# Patient Record
Sex: MALE | Race: WHITE | NOT HISPANIC OR LATINO | Employment: OTHER | URBAN - METROPOLITAN AREA
[De-identification: names, ages, dates, MRNs, and addresses within clinical notes are randomized per-mention and may not be internally consistent; named-entity substitution may affect disease eponyms.]

---

## 2017-03-13 ENCOUNTER — COMMUNICATION - HEALTHEAST (OUTPATIENT)
Dept: INTERNAL MEDICINE | Facility: CLINIC | Age: 52
End: 2017-03-13

## 2017-03-13 DIAGNOSIS — E11.9 TYPE 2 DIABETES MELLITUS WITHOUT COMPLICATION (H): ICD-10-CM

## 2017-06-26 ENCOUNTER — COMMUNICATION - HEALTHEAST (OUTPATIENT)
Dept: INTERNAL MEDICINE | Facility: CLINIC | Age: 52
End: 2017-06-26

## 2017-08-10 ENCOUNTER — COMMUNICATION - HEALTHEAST (OUTPATIENT)
Dept: INTERNAL MEDICINE | Facility: CLINIC | Age: 52
End: 2017-08-10

## 2017-08-10 DIAGNOSIS — E11.9 DIABETES (H): ICD-10-CM

## 2017-08-29 ENCOUNTER — COMMUNICATION - HEALTHEAST (OUTPATIENT)
Dept: INTERNAL MEDICINE | Facility: CLINIC | Age: 52
End: 2017-08-29

## 2017-08-29 DIAGNOSIS — E11.9 TYPE 2 DIABETES MELLITUS WITHOUT COMPLICATION (H): ICD-10-CM

## 2017-09-18 ENCOUNTER — COMMUNICATION - HEALTHEAST (OUTPATIENT)
Dept: INTERNAL MEDICINE | Facility: CLINIC | Age: 52
End: 2017-09-18

## 2017-09-18 DIAGNOSIS — E11.9 DIABETES (H): ICD-10-CM

## 2017-09-28 ENCOUNTER — OFFICE VISIT - HEALTHEAST (OUTPATIENT)
Dept: INTERNAL MEDICINE | Facility: CLINIC | Age: 52
End: 2017-09-28

## 2017-09-28 ENCOUNTER — COMMUNICATION - HEALTHEAST (OUTPATIENT)
Dept: TELEHEALTH | Facility: CLINIC | Age: 52
End: 2017-09-28

## 2017-09-28 DIAGNOSIS — E11.9 DIABETES MELLITUS, TYPE II (H): ICD-10-CM

## 2017-09-28 DIAGNOSIS — Z12.11 SCREEN FOR COLON CANCER: ICD-10-CM

## 2017-09-28 DIAGNOSIS — R53.83 FATIGUE: ICD-10-CM

## 2017-09-28 LAB
CHOLEST SERPL-MCNC: 182 MG/DL
FASTING STATUS PATIENT QL REPORTED: NO
HBA1C MFR BLD: 6.9 % (ref 3.5–6)
HDLC SERPL-MCNC: 28 MG/DL
LDLC SERPL CALC-MCNC: 111 MG/DL
TRIGL SERPL-MCNC: 213 MG/DL

## 2017-10-02 ENCOUNTER — COMMUNICATION - HEALTHEAST (OUTPATIENT)
Dept: INTERNAL MEDICINE | Facility: CLINIC | Age: 52
End: 2017-10-02

## 2017-10-02 DIAGNOSIS — R79.89 LOW TESTOSTERONE IN MALE: ICD-10-CM

## 2017-10-03 ENCOUNTER — COMMUNICATION - HEALTHEAST (OUTPATIENT)
Dept: INTERNAL MEDICINE | Facility: CLINIC | Age: 52
End: 2017-10-03

## 2017-12-19 ENCOUNTER — COMMUNICATION - HEALTHEAST (OUTPATIENT)
Dept: INTERNAL MEDICINE | Facility: CLINIC | Age: 52
End: 2017-12-19

## 2017-12-19 DIAGNOSIS — E11.9 DIABETES (H): ICD-10-CM

## 2017-12-19 DIAGNOSIS — E11.9 TYPE 2 DIABETES MELLITUS WITHOUT COMPLICATION (H): ICD-10-CM

## 2018-04-24 ENCOUNTER — OFFICE VISIT - HEALTHEAST (OUTPATIENT)
Dept: ENDOCRINOLOGY | Facility: CLINIC | Age: 53
End: 2018-04-24

## 2018-04-24 DIAGNOSIS — E11.9 DIABETES MELLITUS, TYPE II (H): ICD-10-CM

## 2018-04-24 LAB
CREAT SERPL-MCNC: 0.91 MG/DL (ref 0.7–1.3)
FERRITIN SERPL-MCNC: 244 NG/ML (ref 27–300)
FSH SERPL-ACNC: 5.6 MIU/ML (ref 0–12)
GFR SERPL CREATININE-BSD FRML MDRD: >60 ML/MIN/1.73M2
HBA1C MFR BLD: 7.3 % (ref 3.5–6)
LH SERPL-ACNC: 2.9 MIU/ML
POTASSIUM BLD-SCNC: 4.5 MMOL/L (ref 3.5–5)
PSA SERPL-MCNC: 0.9 NG/ML (ref 0–3.5)
T4 FREE SERPL-MCNC: 1 NG/DL (ref 0.7–1.8)
TSH SERPL DL<=0.005 MIU/L-ACNC: 1.18 UIU/ML (ref 0.3–5)

## 2018-04-24 ASSESSMENT — MIFFLIN-ST. JEOR: SCORE: 1956.21

## 2018-04-25 LAB — TESTOST SERPL-MCNC: 232 NG/DL (ref 221–716)

## 2018-05-02 ENCOUNTER — COMMUNICATION - HEALTHEAST (OUTPATIENT)
Dept: ENDOCRINOLOGY | Facility: CLINIC | Age: 53
End: 2018-05-02

## 2018-05-02 DIAGNOSIS — E11.9 DIABETES (H): ICD-10-CM

## 2018-09-26 ENCOUNTER — COMMUNICATION - HEALTHEAST (OUTPATIENT)
Dept: INTERNAL MEDICINE | Facility: CLINIC | Age: 53
End: 2018-09-26

## 2018-09-26 DIAGNOSIS — E11.9 TYPE 2 DIABETES MELLITUS WITHOUT COMPLICATION (H): ICD-10-CM

## 2018-09-28 ENCOUNTER — COMMUNICATION - HEALTHEAST (OUTPATIENT)
Dept: INTERNAL MEDICINE | Facility: CLINIC | Age: 53
End: 2018-09-28

## 2018-09-28 ENCOUNTER — OFFICE VISIT - HEALTHEAST (OUTPATIENT)
Dept: INTERNAL MEDICINE | Facility: CLINIC | Age: 53
End: 2018-09-28

## 2018-09-28 DIAGNOSIS — E11.9 DIABETES (H): ICD-10-CM

## 2018-09-28 DIAGNOSIS — E11.9 DIABETES MELLITUS, TYPE II (H): ICD-10-CM

## 2018-09-28 DIAGNOSIS — E11.9 TYPE 2 DIABETES MELLITUS WITHOUT COMPLICATION (H): ICD-10-CM

## 2018-09-28 LAB
ALBUMIN SERPL-MCNC: 3.8 G/DL (ref 3.5–5)
ALP SERPL-CCNC: 105 U/L (ref 45–120)
ALT SERPL W P-5'-P-CCNC: 63 U/L (ref 0–45)
ANION GAP SERPL CALCULATED.3IONS-SCNC: 8 MMOL/L (ref 5–18)
AST SERPL W P-5'-P-CCNC: 41 U/L (ref 0–40)
BILIRUB SERPL-MCNC: 0.7 MG/DL (ref 0–1)
BUN SERPL-MCNC: 14 MG/DL (ref 8–22)
CALCIUM SERPL-MCNC: 9.5 MG/DL (ref 8.5–10.5)
CHLORIDE BLD-SCNC: 108 MMOL/L (ref 98–107)
CHOLEST SERPL-MCNC: 179 MG/DL
CO2 SERPL-SCNC: 25 MMOL/L (ref 22–31)
CREAT SERPL-MCNC: 0.87 MG/DL (ref 0.7–1.3)
CREAT UR-MCNC: 138.3 MG/DL
FASTING STATUS PATIENT QL REPORTED: YES
GFR SERPL CREATININE-BSD FRML MDRD: >60 ML/MIN/1.73M2
GLUCOSE BLD-MCNC: 183 MG/DL (ref 70–125)
HBA1C MFR BLD: 7 % (ref 3.5–6)
HDLC SERPL-MCNC: 30 MG/DL
LDLC SERPL CALC-MCNC: 127 MG/DL
MICROALBUMIN UR-MCNC: 0.69 MG/DL (ref 0–1.99)
MICROALBUMIN/CREAT UR: 5 MG/G
POTASSIUM BLD-SCNC: 4.6 MMOL/L (ref 3.5–5)
PROT SERPL-MCNC: 6.6 G/DL (ref 6–8)
SODIUM SERPL-SCNC: 141 MMOL/L (ref 136–145)
TRIGL SERPL-MCNC: 110 MG/DL

## 2018-10-04 ENCOUNTER — COMMUNICATION - HEALTHEAST (OUTPATIENT)
Dept: LAB | Facility: CLINIC | Age: 53
End: 2018-10-04

## 2018-10-04 DIAGNOSIS — E11.9 DIABETES MELLITUS, TYPE II (H): ICD-10-CM

## 2018-10-18 ENCOUNTER — AMBULATORY - HEALTHEAST (OUTPATIENT)
Dept: LAB | Facility: CLINIC | Age: 53
End: 2018-10-18

## 2018-10-18 DIAGNOSIS — E11.9 DIABETES MELLITUS, TYPE II (H): ICD-10-CM

## 2018-10-18 LAB
CREAT SERPL-MCNC: 0.89 MG/DL (ref 0.7–1.3)
GFR SERPL CREATININE-BSD FRML MDRD: >60 ML/MIN/1.73M2
POTASSIUM BLD-SCNC: 4.6 MMOL/L (ref 3.5–5)

## 2018-10-19 LAB
PSA FREE MFR SERPL: 14 %
PSA FREE SERPL-MCNC: 0.2 NG/ML
PSA SERPL IA-MCNC: 1.4 NG/ML (ref 0–4)
TESTOST SERPL-MCNC: 238 NG/DL (ref 221–716)

## 2018-10-25 ENCOUNTER — OFFICE VISIT - HEALTHEAST (OUTPATIENT)
Dept: ENDOCRINOLOGY | Facility: CLINIC | Age: 53
End: 2018-10-25

## 2018-10-25 DIAGNOSIS — E11.9 DIABETES MELLITUS, TYPE II (H): ICD-10-CM

## 2018-10-25 ASSESSMENT — MIFFLIN-ST. JEOR: SCORE: 1953.49

## 2018-11-23 ENCOUNTER — OFFICE VISIT - HEALTHEAST (OUTPATIENT)
Dept: FAMILY MEDICINE | Facility: CLINIC | Age: 53
End: 2018-11-23

## 2018-11-23 ENCOUNTER — COMMUNICATION - HEALTHEAST (OUTPATIENT)
Dept: INTERNAL MEDICINE | Facility: CLINIC | Age: 53
End: 2018-11-23

## 2018-11-23 DIAGNOSIS — T50.905A URTICARIA DUE TO DRUG ALLERGY: ICD-10-CM

## 2018-11-23 DIAGNOSIS — L50.0 URTICARIA DUE TO DRUG ALLERGY: ICD-10-CM

## 2018-11-23 DIAGNOSIS — E11.9 TYPE 2 DIABETES MELLITUS WITHOUT COMPLICATION, WITHOUT LONG-TERM CURRENT USE OF INSULIN (H): ICD-10-CM

## 2018-11-26 ENCOUNTER — OFFICE VISIT - HEALTHEAST (OUTPATIENT)
Dept: INTERNAL MEDICINE | Facility: CLINIC | Age: 53
End: 2018-11-26

## 2018-11-26 DIAGNOSIS — E11.9 TYPE 2 DIABETES MELLITUS WITHOUT COMPLICATION, WITHOUT LONG-TERM CURRENT USE OF INSULIN (H): ICD-10-CM

## 2018-11-26 DIAGNOSIS — Z12.11 SCREEN FOR COLON CANCER: ICD-10-CM

## 2018-11-28 ENCOUNTER — OFFICE VISIT - HEALTHEAST (OUTPATIENT)
Dept: PHARMACY | Facility: CLINIC | Age: 53
End: 2018-11-28

## 2018-11-28 DIAGNOSIS — E78.5 HYPERLIPIDEMIA, UNSPECIFIED HYPERLIPIDEMIA TYPE: ICD-10-CM

## 2018-11-28 DIAGNOSIS — E11.9 TYPE 2 DIABETES MELLITUS WITHOUT COMPLICATION, WITHOUT LONG-TERM CURRENT USE OF INSULIN (H): ICD-10-CM

## 2018-12-07 ENCOUNTER — OFFICE VISIT - HEALTHEAST (OUTPATIENT)
Dept: FAMILY MEDICINE | Facility: CLINIC | Age: 53
End: 2018-12-07

## 2018-12-07 DIAGNOSIS — L30.9 ECZEMA, UNSPECIFIED TYPE: ICD-10-CM

## 2018-12-12 ENCOUNTER — COMMUNICATION - HEALTHEAST (OUTPATIENT)
Dept: INTERNAL MEDICINE | Facility: CLINIC | Age: 53
End: 2018-12-12

## 2018-12-19 ENCOUNTER — COMMUNICATION - HEALTHEAST (OUTPATIENT)
Dept: INTERNAL MEDICINE | Facility: CLINIC | Age: 53
End: 2018-12-19

## 2018-12-19 ENCOUNTER — COMMUNICATION - HEALTHEAST (OUTPATIENT)
Dept: FAMILY MEDICINE | Facility: CLINIC | Age: 53
End: 2018-12-19

## 2018-12-19 DIAGNOSIS — L50.0 URTICARIA DUE TO DRUG ALLERGY: ICD-10-CM

## 2018-12-19 DIAGNOSIS — T50.905A URTICARIA DUE TO DRUG ALLERGY: ICD-10-CM

## 2018-12-20 ENCOUNTER — OFFICE VISIT - HEALTHEAST (OUTPATIENT)
Dept: FAMILY MEDICINE | Facility: CLINIC | Age: 53
End: 2018-12-20

## 2018-12-20 DIAGNOSIS — L50.9 URTICARIA: ICD-10-CM

## 2018-12-28 ENCOUNTER — OFFICE VISIT (OUTPATIENT)
Dept: DERMATOLOGY | Facility: CLINIC | Age: 53
End: 2018-12-28
Payer: COMMERCIAL

## 2018-12-28 DIAGNOSIS — L71.8 OCULAR ROSACEA: Primary | ICD-10-CM

## 2018-12-28 DIAGNOSIS — R21 RASH: ICD-10-CM

## 2018-12-28 RX ORDER — DOXYCYCLINE 50 MG/1
100 CAPSULE ORAL 2 TIMES DAILY
Qty: 28 CAPSULE | Refills: 0 | Status: SHIPPED | OUTPATIENT
Start: 2018-12-28 | End: 2019-01-04

## 2018-12-28 RX ORDER — TRIAMCINOLONE ACETONIDE 0.25 MG/G
OINTMENT TOPICAL 2 TIMES DAILY
Qty: 60 G | Refills: 1 | Status: SHIPPED | OUTPATIENT
Start: 2018-12-28 | End: 2019-12-28

## 2018-12-28 RX ORDER — HYDROCORTISONE 2.5 %
CREAM (GRAM) TOPICAL 2 TIMES DAILY
COMMUNITY

## 2018-12-28 RX ORDER — MULTIPLE VITAMINS W/ MINERALS TAB 9MG-400MCG
1 TAB ORAL DAILY
COMMUNITY

## 2018-12-28 RX ORDER — GLIMEPIRIDE 2 MG/1
2 TABLET ORAL
COMMUNITY

## 2018-12-28 RX ORDER — ATORVASTATIN CALCIUM 10 MG/1
10 TABLET, FILM COATED ORAL DAILY
COMMUNITY

## 2018-12-28 RX ORDER — FAMOTIDINE 20 MG/1
20 TABLET, FILM COATED ORAL 2 TIMES DAILY
COMMUNITY

## 2018-12-28 RX ORDER — MOMETASONE FUROATE 1 MG/G
OINTMENT TOPICAL DAILY
Qty: 240 G | Refills: 1 | Status: SHIPPED | OUTPATIENT
Start: 2018-12-28 | End: 2019-12-28

## 2018-12-28 ASSESSMENT — PAIN SCALES - GENERAL
PAINLEVEL: NO PAIN (0)
PAINLEVEL: NO PAIN (0)

## 2018-12-28 NOTE — PROGRESS NOTES
"[facility] Dermatology Note      Dermatology Problem List:  1. Rash - Ddx includes allergic contact dermatitis, atopic dermatitis flair, medication allergy, etc.   - Biopsy   - Mometasome and triamcinolone ointments  2. Occular Rosacea   - Doxycycline 100mg BID for 7 days   - Previous: PO doxycycline and topical tetracyclin  3. Xerosis   - Topical emollients        Encounter Date: Dec 28, 2018    CC:   Chief Complaint   Patient presents with     Derm Problem     Rash, Elvin states \" It started as a reaction to an injection about a month ago.\"          History of Present Illness:  Mr. Elvin Eden is a 53 year old male who presents for evaluation of the subacute development of a pruritic rash covering his face, upper back, upper chest and some arms. Patient developed this rash approximately 1 month ago; developed suddenly. He attributes the rash to initiating a new medication: liraglutide; he has subsequently stopped taking liraglutide. The patient went to [facility] and was given 7 days of oral prednisone. This helped somewhat, but rash worsened again after discontinuation. Since then, he has been applying some topical hydrocortisone that he had left over. He is also taking 1 Zyrtec a day as well a 2-4 Benadryl at night to help him sleep. The rash has not gotten any better. He otherwise feels fine.     Past Medical History:   There is no problem list on file for this patient.    No past medical history on file.  No past surgical history on file.    Social History:   reports that  has never smoked. he has never used smokeless tobacco.    Family History:  Family History   Problem Relation Age of Onset     Cancer No family hx of         no skin cancer       Medications:  Current Outpatient Medications   Medication Sig Dispense Refill     ammonium lactate (LAC-HYDRIN) 12 % cream Apply topically 2 times daily as needed for dry skin 385 g 3     atorvastatin (LIPITOR) 10 MG tablet " Take 10 mg by mouth daily       cetirizine (ZYRTEC) 10 MG tablet Take 2 tablets (20 mg) by mouth every evening 180 tablet 3     desonide (DESOWEN) 0.05 % ointment Apply topically 2 times daily To rash on the face and lips until healed 60 g 5     famotidine (PEPCID) 20 MG tablet Take 20 mg by mouth 2 times daily       glimepiride (AMARYL) 2 MG tablet Take 2 mg by mouth every morning (before breakfast)       hydrocortisone 2.5 % cream Apply topically 2 times daily       metFORMIN (GLUCOPHAGE) 1000 MG tablet Take 1,000 mg by mouth 2 times daily (with meals)       multivitamin w/minerals (MULTI-VITAMIN) tablet Take 1 tablet by mouth daily       triamcinolone (KENALOG) 0.1 % ointment Apply topically 2 times daily To red rash on the chest, back, and body as needed until improved. 80 g 5     clobetasol (TEMOVATE) 0.05 % external solution Apply topically 2 times daily To the scalp for itch and rash until clear. (Patient not taking: Reported on 12/28/2018) 60 mL 11     hydrOXYzine (ATARAX) 25 MG tablet Take 2 tablets (50 mg) by mouth At Bedtime (Patient not taking: Reported on 12/28/2018) 60 tablet 11     ketoconazole (NIZORAL) 2 % shampoo To entire wet scalp and then wash off after 5 minutes three times a week. (Patient not taking: Reported on 12/28/2018) 240 mL 11     minocycline (MINOCIN,DYNACIN) 100 MG capsule Take 1 capsule (100 mg) by mouth 2 times daily (Patient not taking: Reported on 12/28/2018) 60 capsule 3        Allergies   Allergen Reactions     Iodine Other (See Comments)     Worsening eczema     Liraglutide Hives     Seasonal Allergies      Victoza      Hives per pateint         Review of Systems:  -As per HPI  -Constitutional: The patient denies fatigue, fevers, chills, unintended weight loss, and night sweats.  -HEENT: Patient denies nonhealing oral sores.  -Skin: As above in HPI. No additional skin concerns.    Physical exam:  Vitals: There were no vitals taken for this visit.  GEN: This is a well  developed, well-nourished male in no acute distress, in a pleasant mood.    SKIN: Total skin excluding the undergarment areas was performed. The exam included the head/face, neck, both arms, chest, back, abdomen, both legs, digits and/or nails.   -There is xerosis of the entire body; worse on lower extremities.   -Erythematous, pink/red plaques worst of face, temples, eyelids, posterior ears, neck. Milder amounts on the back, chest and abdomen; lower extremities are spared.   -There are erythematous macules and scattered telangectasias on the bilateral eyelids  -No other lesions of concern on areas examined.     Impression/Plan:  1. Rash - Ddx includes allergic contact dermatitis, atopic dermatitis flair, medication allergy, etc.     Punch biopsy:  After discussion of benefits and risks including but not limited to bleeding/bruising, pain/swelling, infection, scar, incomplete removal, nerve damage/numbness, recurrence, and non-diagnostic biopsy, written consent, verbal consent and photographs were obtained. Time-out was performed. The area was cleaned with isopropyl alcohol. 0.5mL of 1% lidocaine with 1:100,000 epinephrine was injected to obtain adequate anesthesia of the lesion on the left posterior shoulder.  A 4 mm punch biopsy was performed. 4-0 ethilon sutures were utilized to approximate the epidermal edges. White petroleum jelly/Vaseline and a bandage was applied to the wound. Explicit verbal and written wound care instructions were provided. The patient left the Dermatology Clinic in good condition. The patient was counseled to follow up for suture removal in approximately 7 days.    Apply mometasome ointment 0.1% to rash on body and arm twice a day    Apply triamcinolone 0.025% ointment to rash on face twice a day    Start using vanicream products (shampoo, conditioner, and soap) as opposed to dove products    2. Occular Rosacea    Doxycylcine 100mg twice a day ongoing.         3. Xerosis    Use aquaphore  or vaseline ointments, especially after shower/bathing      Follow-up in 4-6 weeks, earlier for new or changing lesions.     Josemanuel Roque MD  Internal Medicine Resident, PGY3  [phone]    Dr. Velazquez staffed the patient.    Staff Involved:  Resident(Josemanuel Roque)/Staff(as above)      I have personally examined this patient and agree with Dr. Roque' documentation and plan of care. I have reviewed and amended the resident's note above. The documentation accurately reflects my clinical observations, diagnoses, treatment and follow-up plans. I was present for key portions of the procedure.       Sandie Velazquez MD  Dermatology Staff

## 2018-12-28 NOTE — PATIENT INSTRUCTIONS
- Apply mometasome ointment to rash on body and arm twice a day    - Apply triamcinolone ointment to rash on face twice a day    - Take doxycylcine 100mg twice a day for 7 day for occular rosacea (the rash on your eyes)    - Start using vanicream products (shampoo, conditioner, and soap) as opposed to dove products    - Use aquaphore or vaseline ointment for you dry skin (especially your legs)    - We took a skin biopsy today; you will be called with the results in ~2 weeks    Wound Care After a Biopsy    What is a skin biopsy?  A skin biopsy allows the doctor to examine a very small piece of tissue under the microscope to determine the diagnosis and the best treatment for the skin condition. A local anesthetic (numbing medicine)  is injected with a very small needle into the skin area to be tested. A small piece of skin is taken from the area. Sometimes a suture (stitch) is used.     What are the risks of a skin biopsy?  I will experience scar, bleeding, swelling, pain, crusting and redness. I may experience incomplete removal or recurrence. Risks of this procedure are excessive bleeding, bruising, infection, nerve damage, numbness, thick (hypertrophic or keloidal) scar and non-diagnostic biopsy.    How should I care for my wound for the first 24 hours?    Keep the wound dry and covered for 24 hours    If it bleeds, hold direct pressure on the area for 15 minutes. If bleeding does not stop then go to the emergency room    Avoid strenuous exercise the first 1-2 days or as your doctor instructs you    How should I care for the wound after 24 hours?    After 24 hours, remove the bandage    You may bathe or shower as normal    If you had a scalp biopsy, you can shampoo as usual and can use shower water to clean the biopsy site daily    Clean the wound twice a day with gentle soap and water    Do not scrub, be gentle    Apply white petroleum/Vaseline after cleaning the wound with a cotton swab or a clean finger, and  keep the site covered with a Bandaid /bandage. Bandages are not necessary with a scalp biopsy    If you are unable to cover the site with a Bandaid /bandage, re-apply ointment 2-3 times a day to keep the site moist. Moisture will help with healing    Avoid strenuous activity for first 1-2 days    Avoid lakes, rivers, pools, and oceans until the stitches are removed or the site is healed    How do I clean my wound?    Wash hands thoroughly with soap or use hand  before all wound care    Clean the wound with gentle soap and water    Apply white petroleum/Vaseline  to wound after it is clean    Replace the Bandaid /bandage to keep the wound covered for the first few days or as instructed by your doctor    If you had a scalp biopsy, warm shower water to the area on a daily basis should suffice    What should I use to clean my wound?     Cotton-tipped applicators (Qtips )    White petroleum jelly (Vaseline ). Use a clean new container and use Q-tips to apply.    Bandaids   as needed    Gentle soap     How should I care for my wound long term?    Do not get your wound dirty    Keep up with wound care for one week or until the area is healed.    A small scab will form and fall off by itself when the area is completely healed. The area will be red and will become pink in color as it heals. Sun protection is very important for how your scar will turn out. Sunscreen with an SPF 30 or greater is recommended once the area is healed.    If you have stitches, stitches need to be removed in 10-14 days. You may return to our clinic for this or you may have it done locally at your doctor s office.    You should have some soreness but it should be mild and slowly go away over several days. Talk to your doctor about using tylenol for pain,    When should I call my doctor?  If you have increased:     Pain or swelling    Pus or drainage (clear or slightly yellow drainage is ok)    Temperature over 100F    Spreading redness or  warmth around wound    When will I hear about my results?  The biopsy results can take 2-3 weeks to come back. The clinic will call you with the results, send you a [employer] message, or have you schedule a follow-up clinic or phone time to discuss the results. Contact our clinics if you do not hear from us in 3 weeks.     Who should I call with questions?    [facility]: [phone]     [facility]: [phone]    For urgent needs outside of business hours call the [facility] [[email or web address]] at [phone] and ask for the dermatology resident on call

## 2018-12-28 NOTE — LETTER
"12/28/2018       RE: Elvin Eden  [address] [address]     Dear Colleague,    Thank you for referring your patient, Elvin Eden, to the [facility] DERMATOLOGY at [facility]. Please see a copy of my visit note below.    [facility] Dermatology Note      Dermatology Problem List:  1. Rash - Ddx includes allergic contact dermatitis, atopic dermatitis flair, medication allergy, etc.   - Biopsy   - Mometasome and triamcinolone ointments  2. Occular Rosacea   - Doxycycline 100mg BID for 7 days   - Previous: PO doxycycline and topical tetracyclin  3. Xerosis   - Topical emollients        Encounter Date: Dec 28, 2018    CC:   Chief Complaint   Patient presents with     Derm Problem     Rash, Elvin states \" It started as a reaction to an injection about a month ago.\"          History of Present Illness:  Mr. Elvin Eden is a 53 year old male who presents for evaluation of the subacute development of a pruritic rash covering his face, upper back, upper chest and some arms. Patient developed this rash approximately 1 month ago; developed suddenly. He attributes the rash to initiating a new medication: liraglutide; he has subsequently stopped taking liraglutide. The patient went to [facility] and was given 7 days of oral prednisone. This helped somewhat, but rash worsened again after discontinuation. Since then, he has been applying some topical hydrocortisone that he had left over. He is also taking 1 Zyrtec a day as well a 2-4 Benadryl at night to help him sleep. The rash has not gotten any better. He otherwise feels fine.     Past Medical History:   There is no problem list on file for this patient.    No past medical history on file.  No past surgical history on file.    Social History:   reports that  has never smoked. he has never used smokeless tobacco.    Family History:  Family History   Problem " Relation Age of Onset     Cancer No family hx of         no skin cancer       Medications:  Current Outpatient Medications   Medication Sig Dispense Refill     ammonium lactate (LAC-HYDRIN) 12 % cream Apply topically 2 times daily as needed for dry skin 385 g 3     atorvastatin (LIPITOR) 10 MG tablet Take 10 mg by mouth daily       cetirizine (ZYRTEC) 10 MG tablet Take 2 tablets (20 mg) by mouth every evening 180 tablet 3     desonide (DESOWEN) 0.05 % ointment Apply topically 2 times daily To rash on the face and lips until healed 60 g 5     famotidine (PEPCID) 20 MG tablet Take 20 mg by mouth 2 times daily       glimepiride (AMARYL) 2 MG tablet Take 2 mg by mouth every morning (before breakfast)       hydrocortisone 2.5 % cream Apply topically 2 times daily       metFORMIN (GLUCOPHAGE) 1000 MG tablet Take 1,000 mg by mouth 2 times daily (with meals)       multivitamin w/minerals (MULTI-VITAMIN) tablet Take 1 tablet by mouth daily       triamcinolone (KENALOG) 0.1 % ointment Apply topically 2 times daily To red rash on the chest, back, and body as needed until improved. 80 g 5     clobetasol (TEMOVATE) 0.05 % external solution Apply topically 2 times daily To the scalp for itch and rash until clear. (Patient not taking: Reported on 12/28/2018) 60 mL 11     hydrOXYzine (ATARAX) 25 MG tablet Take 2 tablets (50 mg) by mouth At Bedtime (Patient not taking: Reported on 12/28/2018) 60 tablet 11     ketoconazole (NIZORAL) 2 % shampoo To entire wet scalp and then wash off after 5 minutes three times a week. (Patient not taking: Reported on 12/28/2018) 240 mL 11     minocycline (MINOCIN,DYNACIN) 100 MG capsule Take 1 capsule (100 mg) by mouth 2 times daily (Patient not taking: Reported on 12/28/2018) 60 capsule 3        Allergies   Allergen Reactions     Iodine Other (See Comments)     Worsening eczema     Liraglutide Hives     Seasonal Allergies      Victoza      Hives per pateint         Review of Systems:  -As per  HPI  -Constitutional: The patient denies fatigue, fevers, chills, unintended weight loss, and night sweats.  -HEENT: Patient denies nonhealing oral sores.  -Skin: As above in HPI. No additional skin concerns.    Physical exam:  Vitals: There were no vitals taken for this visit.  GEN: This is a well developed, well-nourished male in no acute distress, in a pleasant mood.    SKIN: Total skin excluding the undergarment areas was performed. The exam included the head/face, neck, both arms, chest, back, abdomen, both legs, digits and/or nails.   -There is xerosis of the entire body; worse on lower extremities.   -Erythematous, pink/red plaques worst of face, temples, eyelids, posterior ears, neck. Milder amounts on the back, chest and abdomen; lower extremities are spared.   -There are erythematous macules and scattered telangectasias on the bilateral eyelids  -No other lesions of concern on areas examined.     Impression/Plan:  1. Rash - Ddx includes allergic contact dermatitis, atopic dermatitis flair, medication allergy, etc.     Punch biopsy:  After discussion of benefits and risks including but not limited to bleeding/bruising, pain/swelling, infection, scar, incomplete removal, nerve damage/numbness, recurrence, and non-diagnostic biopsy, written consent, verbal consent and photographs were obtained. Time-out was performed. The area was cleaned with isopropyl alcohol. 0.5mL of 1% lidocaine with 1:100,000 epinephrine was injected to obtain adequate anesthesia of the lesion on the left posterior shoulder.  A 4 mm punch biopsy was performed. 4-0 ethilon sutures were utilized to approximate the epidermal edges. White petroleum jelly/Vaseline and a bandage was applied to the wound. Explicit verbal and written wound care instructions were provided. The patient left the Dermatology Clinic in good condition. The patient was counseled to follow up for suture removal in approximately 7 days.    Apply mometasome ointment 0.1%  to rash on body and arm twice a day    Apply triamcinolone 0.025% ointment to rash on face twice a day    Start using vanicream products (shampoo, conditioner, and soap) as opposed to dove products    2. Occular Rosacea    Doxycylcine 100mg twice a day ongoing.         3. Xerosis    Use aquaphore or vaseline ointments, especially after shower/bathing      Follow-up in 4-6 weeks, earlier for new or changing lesions.     Josemanuel Roque MD  Internal Medicine Resident, PGY3  [phone]    Dr. Velazquez staffed the patient.    Staff Involved:  Resident(Josemanuel Roque)/Staff(as above)    I have personally examined this patient and agree with Dr. Roque' documentation and plan of care. I have reviewed and amended the resident's note above. The documentation accurately reflects my clinical observations, diagnoses, treatment and follow-up plans. I was present for key portions of the procedure.                 Sandie Velazquez MD

## 2018-12-28 NOTE — NURSING NOTE
"Dermatology Rooming Note    Elvin Eden's goals for this visit include:   Chief Complaint   Patient presents with     Derm Problem     Rash, Elvin states \" It started as a reaction to an injection about a month ago.\"      Shahla Ren LPN    "

## 2018-12-29 RX ORDER — DOXYCYCLINE 100 MG/1
100 CAPSULE ORAL DAILY
Qty: 30 CAPSULE | Refills: 3 | Status: SHIPPED | OUTPATIENT
Start: 2018-12-29

## 2018-12-31 ENCOUNTER — COMMUNICATION - HEALTHEAST (OUTPATIENT)
Dept: PHARMACY | Facility: CLINIC | Age: 53
End: 2018-12-31

## 2018-12-31 ENCOUNTER — COMMUNICATION - HEALTHEAST (OUTPATIENT)
Dept: TELEHEALTH | Facility: CLINIC | Age: 53
End: 2018-12-31

## 2018-12-31 ENCOUNTER — OFFICE VISIT - HEALTHEAST (OUTPATIENT)
Dept: PHARMACY | Facility: CLINIC | Age: 53
End: 2018-12-31

## 2018-12-31 DIAGNOSIS — E78.5 HYPERLIPIDEMIA, UNSPECIFIED HYPERLIPIDEMIA TYPE: ICD-10-CM

## 2018-12-31 DIAGNOSIS — E11.9 TYPE 2 DIABETES MELLITUS WITHOUT COMPLICATION, WITHOUT LONG-TERM CURRENT USE OF INSULIN (H): ICD-10-CM

## 2019-01-25 ENCOUNTER — COMMUNICATION - HEALTHEAST (OUTPATIENT)
Dept: INTERNAL MEDICINE | Facility: CLINIC | Age: 54
End: 2019-01-25

## 2019-01-25 ENCOUNTER — DOCUMENTATION ONLY (OUTPATIENT)
Dept: CARE COORDINATION | Facility: CLINIC | Age: 54
End: 2019-01-25

## 2019-01-25 DIAGNOSIS — E11.9 TYPE 2 DIABETES MELLITUS WITHOUT COMPLICATION (H): ICD-10-CM

## 2019-02-04 ENCOUNTER — COMMUNICATION - HEALTHEAST (OUTPATIENT)
Dept: PHARMACY | Facility: CLINIC | Age: 54
End: 2019-02-04

## 2019-02-04 DIAGNOSIS — E78.5 HYPERLIPIDEMIA, UNSPECIFIED HYPERLIPIDEMIA TYPE: ICD-10-CM

## 2019-02-04 DIAGNOSIS — E11.9 TYPE 2 DIABETES MELLITUS WITHOUT COMPLICATION (H): ICD-10-CM

## 2019-02-04 DIAGNOSIS — E11.9 TYPE 2 DIABETES MELLITUS WITHOUT COMPLICATION, WITHOUT LONG-TERM CURRENT USE OF INSULIN (H): ICD-10-CM

## 2019-02-27 ENCOUNTER — COMMUNICATION - HEALTHEAST (OUTPATIENT)
Dept: INTERNAL MEDICINE | Facility: CLINIC | Age: 54
End: 2019-02-27

## 2019-02-27 DIAGNOSIS — L50.9 URTICARIA: ICD-10-CM

## 2019-03-12 ENCOUNTER — AMBULATORY - HEALTHEAST (OUTPATIENT)
Dept: PHARMACY | Facility: CLINIC | Age: 54
End: 2019-03-12

## 2019-03-12 DIAGNOSIS — E11.9 TYPE 2 DIABETES MELLITUS WITHOUT COMPLICATION, WITHOUT LONG-TERM CURRENT USE OF INSULIN (H): ICD-10-CM

## 2019-03-12 DIAGNOSIS — E78.5 HYPERLIPIDEMIA, UNSPECIFIED HYPERLIPIDEMIA TYPE: ICD-10-CM

## 2019-04-18 ENCOUNTER — COMMUNICATION - HEALTHEAST (OUTPATIENT)
Dept: FAMILY MEDICINE | Facility: CLINIC | Age: 54
End: 2019-04-18

## 2019-04-18 ENCOUNTER — AMBULATORY - HEALTHEAST (OUTPATIENT)
Dept: LAB | Facility: CLINIC | Age: 54
End: 2019-04-18

## 2019-04-18 ENCOUNTER — HOSPITAL ENCOUNTER (OUTPATIENT)
Dept: LAB | Age: 54
Setting detail: SPECIMEN
Discharge: HOME | End: 2019-04-18

## 2019-04-18 DIAGNOSIS — E11.9 TYPE 2 DIABETES MELLITUS WITHOUT COMPLICATION, WITHOUT LONG-TERM CURRENT USE OF INSULIN (H): ICD-10-CM

## 2019-04-18 DIAGNOSIS — E11.9 DIABETES MELLITUS, TYPE II (H): ICD-10-CM

## 2019-04-18 LAB
CHOLEST SERPL-MCNC: 140 MG/DL
CREAT SERPL-MCNC: 0.85 MG/DL (ref 0.7–1.3)
FASTING STATUS PATIENT QL REPORTED: NO
GFR SERPL CREATININE-BSD FRML MDRD: >60 ML/MIN/1.73M2
HBA1C MFR BLD: 6.2 % (ref 3.5–6)
HDLC SERPL-MCNC: 21 MG/DL
LDLC SERPL CALC-MCNC: 90 MG/DL
POTASSIUM BLD-SCNC: 4.4 MMOL/L (ref 3.5–5)
TRIGL SERPL-MCNC: 146 MG/DL

## 2019-04-24 ENCOUNTER — COMMUNICATION - HEALTHEAST (OUTPATIENT)
Dept: ENDOCRINOLOGY | Facility: CLINIC | Age: 54
End: 2019-04-24

## 2019-07-09 ENCOUNTER — OFFICE VISIT - HEALTHEAST (OUTPATIENT)
Dept: FAMILY MEDICINE | Facility: CLINIC | Age: 54
End: 2019-07-09

## 2019-07-09 DIAGNOSIS — J20.8 ACUTE VIRAL BRONCHITIS: ICD-10-CM

## 2019-07-17 ENCOUNTER — OFFICE VISIT - HEALTHEAST (OUTPATIENT)
Dept: FAMILY MEDICINE | Facility: CLINIC | Age: 54
End: 2019-07-17

## 2019-07-17 DIAGNOSIS — R09.89 RESPIRATORY CRACKLES AT LEFT LUNG BASE: ICD-10-CM

## 2019-07-19 ENCOUNTER — OFFICE VISIT - HEALTHEAST (OUTPATIENT)
Dept: INTERNAL MEDICINE | Facility: CLINIC | Age: 54
End: 2019-07-19

## 2019-07-19 DIAGNOSIS — J18.9 COMMUNITY ACQUIRED PNEUMONIA OF RIGHT MIDDLE LOBE OF LUNG: ICD-10-CM

## 2019-07-19 DIAGNOSIS — E11.9 TYPE 2 DIABETES MELLITUS WITHOUT COMPLICATION, WITHOUT LONG-TERM CURRENT USE OF INSULIN (H): ICD-10-CM

## 2019-07-30 ENCOUNTER — COMMUNICATION - HEALTHEAST (OUTPATIENT)
Dept: FAMILY MEDICINE | Facility: CLINIC | Age: 54
End: 2019-07-30

## 2019-07-30 DIAGNOSIS — J20.8 ACUTE VIRAL BRONCHITIS: ICD-10-CM

## 2019-08-21 ENCOUNTER — COMMUNICATION - HEALTHEAST (OUTPATIENT)
Dept: INTERNAL MEDICINE | Facility: CLINIC | Age: 54
End: 2019-08-21

## 2019-08-22 ENCOUNTER — COMMUNICATION - HEALTHEAST (OUTPATIENT)
Dept: FAMILY MEDICINE | Facility: CLINIC | Age: 54
End: 2019-08-22

## 2019-08-22 DIAGNOSIS — J20.8 ACUTE VIRAL BRONCHITIS: ICD-10-CM

## 2019-08-27 ENCOUNTER — COMMUNICATION - HEALTHEAST (OUTPATIENT)
Dept: INTERNAL MEDICINE | Facility: CLINIC | Age: 54
End: 2019-08-27

## 2019-09-04 ENCOUNTER — COMMUNICATION - HEALTHEAST (OUTPATIENT)
Dept: INTERNAL MEDICINE | Facility: CLINIC | Age: 54
End: 2019-09-04

## 2019-12-04 ENCOUNTER — COMMUNICATION - HEALTHEAST (OUTPATIENT)
Dept: INTERNAL MEDICINE | Facility: CLINIC | Age: 54
End: 2019-12-04

## 2020-04-21 ENCOUNTER — COMMUNICATION - HEALTHEAST (OUTPATIENT)
Dept: INTERNAL MEDICINE | Facility: CLINIC | Age: 55
End: 2020-04-21

## 2020-04-21 DIAGNOSIS — E11.9 TYPE 2 DIABETES MELLITUS WITHOUT COMPLICATION, WITHOUT LONG-TERM CURRENT USE OF INSULIN (H): ICD-10-CM

## 2020-07-15 ENCOUNTER — COMMUNICATION - HEALTHEAST (OUTPATIENT)
Dept: INTERNAL MEDICINE | Facility: CLINIC | Age: 55
End: 2020-07-15

## 2020-07-15 DIAGNOSIS — E11.9 TYPE 2 DIABETES MELLITUS WITHOUT COMPLICATION, WITHOUT LONG-TERM CURRENT USE OF INSULIN (H): ICD-10-CM

## 2020-09-23 ENCOUNTER — COMMUNICATION - HEALTHEAST (OUTPATIENT)
Dept: INTERNAL MEDICINE | Facility: CLINIC | Age: 55
End: 2020-09-23

## 2020-09-23 DIAGNOSIS — E11.9 TYPE 2 DIABETES MELLITUS WITHOUT COMPLICATION (H): ICD-10-CM

## 2020-11-13 ENCOUNTER — COMMUNICATION - HEALTHEAST (OUTPATIENT)
Dept: INTERNAL MEDICINE | Facility: CLINIC | Age: 55
End: 2020-11-13

## 2020-11-13 DIAGNOSIS — E11.9 TYPE 2 DIABETES MELLITUS WITHOUT COMPLICATION, WITHOUT LONG-TERM CURRENT USE OF INSULIN (H): ICD-10-CM

## 2021-04-24 ENCOUNTER — COMMUNICATION - HEALTHEAST (OUTPATIENT)
Dept: INTERNAL MEDICINE | Facility: CLINIC | Age: 56
End: 2021-04-24

## 2021-04-24 DIAGNOSIS — E11.9 TYPE 2 DIABETES MELLITUS WITHOUT COMPLICATION, WITHOUT LONG-TERM CURRENT USE OF INSULIN (H): ICD-10-CM

## 2021-05-27 NOTE — TELEPHONE ENCOUNTER
RN cannot approve Refill Request    RN can NOT refill this medication overdue for office visits and/or labs.    Mateo Salgado, [facility] Triage/Med Refill 4/18/2019    Requested Prescriptions   Pending Prescriptions Disp Refills     glimepiride (AMARYL) 1 MG tablet [Pharmacy Med Name: GLIMEPIRIDE 1MG TABLETS] 60 tablet 0     Sig: TAKE 2 TABLETS BY MOUTH EVERY MORNING.       Oral Hypoglycemics Refill Protocol Failed - 4/18/2019  3:14 AM        Failed - A1C in last 6 months     Hemoglobin A1c   Date Value Ref Range Status   09/28/2018 7.0 (H) 3.5 - 6.0 % Final               Passed - Visit with PCP or prescribing provider visit in last 6 months       Last office visit with prescriber/PCP: 11/23/2018 OR same dept: 12/20/2018 Sarah Wheeler CNP OR same specialty: 12/20/2018 Sarah Wheeler CNP Last physical: Visit date not found Last MTM visit: Visit date not found         Next appt within 3 mo: Visit date not found  Next physical within 3 mo: Visit date not found  Prescriber OR PCP: Jose Enrique Coleman MD  Last diagnosis associated with med order: 1. Type 2 diabetes mellitus without complication, without long-term current use of insulin (H)  - glimepiride (AMARYL) 1 MG tablet [Pharmacy Med Name: GLIMEPIRIDE 1MG TABLETS]; TAKE 2 TABLETS BY MOUTH EVERY MORNING.  Dispense: 60 tablet; Refill: 0     If protocol passes may refill for 12 months if within 3 months of last provider visit (or a total of 15 months).           Passed - Microalbumin in last year      Microalbumin, Random Urine   Date Value Ref Range Status   09/28/2018 0.69 0.00 - 1.99 mg/dL Final                  Passed - Blood pressure in last year     BP Readings from Last 1 Encounters:   04/13/19 96/61             Passed - Serum creatinine in last year     Creatinine   Date Value Ref Range Status   04/12/2019 0.98 0.70 - 1.30 mg/dL Final

## 2021-05-28 NOTE — TELEPHONE ENCOUNTER
----- Message from Dany Newberry MD sent at 4/23/2019 11:59 AM CDT -----  Labs rev, they are normal. Diabetes is showing good control. Continue same medication. Check same labs in 6 months.

## 2021-05-30 NOTE — PROGRESS NOTES
ASSESSMENT and PLAN:    #1.  Clinical diagnosis of community-acquired pneumonia.  Given his negative x-ray this may have been a bronchitis, but nonetheless on azithromycin should cover this well and it appears to be doing so.  He will finish out his course of antibiotics and follow-up as needed.    2.  Type 2 diabetes, controlled.  He is due to follow-up with Dr. Aviles in October of this year.    Problem List Items Addressed This Visit     Diabetes mellitus, type II (H)      Other Visit Diagnoses     Community acquired pneumonia of right middle lobe of lung (H)    -  Primary          There are no Patient Instructions on file for this visit.    There are no discontinued medications.    No follow-ups on file.    CHIEF COMPLAINT:  Chief Complaint   Patient presents with     Follow-up     Walk in URI - pt states he is getting better - 3rd day of antibiotics        HISTORY OF PRESENT ILLNESS:  Elvin Eden is a 54 y.o. male  presenting to the clinic today for follow-up of a diagnosis community-acquired pneumonia.  He initially presented to the walk-in clinic on 7/9 with complaints of a cough and fevers.  He was diagnosed with viral bronchitis and was placed on Tessalon Perles.  He is cough did not improve and he was seen again in walk-in clinic on 7/17.  Chest x-ray was unremarkable, but there were some rales heard in the left lung fields.  He was empirically treated for community acquired pneumonia with azithromycin and was told to follow-up today.  He is feeling well today and states that his cough is improving.  No fevers since he started his antibiotics.    REVIEW OF SYSTEMS:   Pertinent positives noted in HPI, remainder of ROS is negative.    PFSH:      MEDICATIONS:  Current Outpatient Medications   Medication Sig Dispense Refill     atorvastatin (LIPITOR) 10 MG tablet Take 1 tablet (10 mg total) by mouth daily. 90 tablet 3     azithromycin (ZITHROMAX) 250 MG tablet Take 2 tablets daily x 1 day, then 1  tablet daily x 4 days 6 tablet 0     benzonatate (TESSALON) 200 MG capsule Take 1 capsule (200 mg total) by mouth 3 (three) times a day as needed for cough. 21 capsule 0     blood glucose test strips Use 3 each As Directed daily. Dispense brand per patient's insurance at pharmacy discretion. 300 strip 1     cetirizine (ZYRTEC) 10 MG tablet Take 1 tablet (10 mg total) by mouth daily. 30 tablet 0     dulaglutide (TRULICITY) 1.5 mg/0.5 mL PnIj Inject 1.5 mg under the skin every 7 days. 12 Syringe 5     famotidine (PEPCID) 20 MG tablet Take 1 tablet (20 mg total) by mouth 2 (two) times a day. 180 tablet 2     generic lancets (FINGERSTIX LANCETS) Use 3 each As Directed daily. Dispense brand per patient's insurance at pharmacy discretion. 300 each 1     glimepiride (AMARYL) 1 MG tablet TAKE 2 TABLETS BY MOUTH EVERY MORNING. 60 tablet 0     hydrocortisone 2.5 % cream Apply to head and face three times a day as needed for Eczema.. 30 g 2     metFORMIN (GLUCOPHAGE) 1000 MG tablet Take 1 tablet (1,000 mg total) by mouth 2 (two) times a day with meals. 180 tablet 3     ondansetron (ZOFRAN ODT) 4 MG disintegrating tablet Take 1 tablet (4 mg total) by mouth every 8 (eight) hours as needed. 15 tablet 0     triamcinolone (KENALOG) 0.1 % cream Apply neck down three times a day as needed for eczema. 30 g 2     albuterol (PROAIR HFA) 90 mcg/actuation inhaler Inhale 1-2 puffs every 4 (four) hours as needed for wheezing or shortness of breath (cough). 1 Inhaler 0     No current facility-administered medications for this visit.        TOBACCO USE:  Social History     Tobacco Use   Smoking Status Never Smoker   Smokeless Tobacco Never Used       VITALS:  Vitals:    07/19/19 1308   BP: 108/68   Pulse: (!) 104   Weight: (!) 232 lb (105.2 kg)     Wt Readings from Last 3 Encounters:   07/19/19 (!) 232 lb (105.2 kg)   07/17/19 (!) 232 lb (105.2 kg)   07/09/19 (!) 233 lb (105.7 kg)         PHYSICAL EXAM:  Constitutional:  Reveals an alert,  pleasant  male.   Vitals:  Per nursing notes.   Body mass index is 31.46 kg/m .    Lungs: Clear to auscultation bilaterally, respirations unlabored.     Neurologic: Normal

## 2021-05-30 NOTE — TELEPHONE ENCOUNTER
Refill Approved    Rx renewed per Medication Renewal Policy. Medication was last renewed on 7/9/19.    Last office visit 7/19/19    Mateo Salgado, [facility] Connection Triage/Med Refill 7/30/2019     Requested Prescriptions   Pending Prescriptions Disp Refills     albuterol (PROAIR HFA;PROVENTIL HFA;VENTOLIN HFA) 90 mcg/actuation inhaler [Pharmacy Med Name: ALBUTEROL HFA INH (200 PUFFS) 8.5GM] 8.5 g 0     Sig: INHALE 1 TO 2 PUFFS BY MOUTH EVERY 4 HOURS AS NEEDED FOR WHEEZING OR SHORTNESS OF BREATH OR COUGH       Albuterol/Levalbuterol Refill Protocol Passed - 7/30/2019  3:14 AM        Passed - PCP or prescribing provider visit in last year     Last office visit with prescriber/PCP: 7/9/2019 Elmira Link MD OR same dept: 7/17/2019 Radha Heredia PA-C OR same specialty: 7/17/2019 Radha Heredia PA-C Last physical: Visit date not found       Next appt within 3 mo: Visit date not found  Next physical within 3 mo: Visit date not found  Prescriber OR PCP: Elmira Link MD  Last diagnosis associated with med order: 1. Acute viral bronchitis  - albuterol (PROAIR HFA;PROVENTIL HFA;VENTOLIN HFA) 90 mcg/actuation inhaler [Pharmacy Med Name: ALBUTEROL HFA INH (200 PUFFS) 8.5GM]; INHALE 1 TO 2 PUFFS BY MOUTH EVERY 4 HOURS AS NEEDED FOR WHEEZING OR SHORTNESS OF BREATH OR COUGH  Dispense: 8.5 g; Refill: 0    If protocol passes may refill for 6 months if within 3 months of last provider visit (or a total of 9 months). If patient requesting >1 inhaler per month refill x 6 months and have patient make appointment with provider.

## 2021-05-30 NOTE — PATIENT INSTRUCTIONS - HE
There were no signs of pneumonia on your xray, but since I am hearing crackles while you are taking deep breaths I'd like to have you covered for pneumonia.       May take Tessalon Perles as needed for cough.  May also try to use Mucinex or Robitussin.    Follow up in 2 days for reevaluation of your lungs and vitals.     Please monitor symptoms carefully.  If developing chest pain, worsening shortness of breath, worsening fever, coughing up blood, extreme fatigue, or any other new, concerning symptoms, come back to clinic or go to ER immediately.

## 2021-05-30 NOTE — PROGRESS NOTES
Chief Complaint   Patient presents with     URI       HPI:  Elvin Eden is a 54 y.o. male with past medical history of DM 2 who presents today complaining of cough, HA, fever, and fatigue x 10 days.  Patient was seen on 7/9/2019 in the walk-in care for evaluation of the symptoms.  At that time he was diagnosed with bronchitis and discharged home.  No diagnostic studies were done during this visit.  He was prescribed albuterol.  He is currently taking Dayquil, Nyquil, and Advil.  When asked about rash the patient reported a blistering sunburn on his back.  Patient also reports a cramping sensation in his left side of his jaw.    History obtained from the patient.    Problem List:  2015-08: Diabetes mellitus, type II (H)  2015-08: Eczema      Past Medical History:   Diagnosis Date     Diabetes mellitus, type II (H) 8/21/2015     Eczema 8/21/2015       Social History     Tobacco Use     Smoking status: Never Smoker     Smokeless tobacco: Never Used   Substance Use Topics     Alcohol use: No       Review of Systems   Constitutional: Positive for chills, fatigue and fever.   HENT: Positive for sore throat. Negative for congestion, ear pain, rhinorrhea, sinus pressure and sinus pain.    Respiratory: Positive for cough (dry) and shortness of breath. Negative for wheezing.    Gastrointestinal: Negative for abdominal pain, diarrhea, nausea and vomiting.   Skin: Positive for rash.   Neurological: Positive for headaches.       Vitals:    07/17/19 1844   BP: 125/76   Patient Site: Right Arm   Patient Position: Sitting   Cuff Size: Adult Large   Pulse: (!) 111   Resp: 18   Temp: (!) 100.8  F (38.2  C)   TempSrc: Oral   SpO2: 94%   Weight: (!) 232 lb (105.2 kg)       Physical Exam   Constitutional: He appears well-developed and well-nourished. No distress.   HENT:   Head: Normocephalic and atraumatic.   Right Ear: External ear normal.   Left Ear: External ear normal.   Mouth/Throat: No oropharyngeal exudate, posterior  oropharyngeal edema, posterior oropharyngeal erythema or tonsillar abscesses.   Patient has full range of motion of the jaw.  His TMJ is nontender to palpation.   Eyes: Conjunctivae are normal. Right eye exhibits no discharge. Left eye exhibits no discharge.   Neck: Normal range of motion. Neck supple.   Cardiovascular: Normal rate, regular rhythm and normal heart sounds.   Pulmonary/Chest: Effort normal. No stridor. No respiratory distress. He has no wheezes. He has rales (Left middle lung field).   Lymphadenopathy:     He has no cervical adenopathy.   Skin: He is not diaphoretic.   Psychiatric: He has a normal mood and affect. His behavior is normal. Judgment and thought content normal.     Radiology:  I have personally ordered and preliminarily reviewed the following xray, I have noted no significant lobar infiltrates  Xr Chest 2 Views    Result Date: 7/17/2019  EXAM: XR CHEST 2 VIEWS LOCATION: [facility] DATE/TIME: 7/17/2019 7:46 PM INDICATION: LEFT LUNG CRACKLES. FEVER AND COUGH X10 DAYS. NO RECENT TRAVEL COMPARISON: None. FINDINGS: Negative chest.      Clinical Decision Making:  Chest x-ray was negative for signs of pneumonia today.  Crackles were noted on physical exam and patient has been febrile for greater than 1 week.  For this reason I recommended empiric therapy for pneumonia.  We discussed the possibility of other diagnoses, and discussed the option of seek emergency medical attention for higher level imaging and labs, but the patient is not interested going to the ED today.  He is agreeable to close follow-up with primary care.  His primary care provider is not available, but they are able to schedule him with  in 2 days.   At the end of the encounter, I discussed results, diagnosis, medications. Discussed red flags for immediate return to clinic/ER, as well as indications for follow up if no improvement. Patient understood and agreed to plan. Patient was stable for  discharge.    1. Respiratory crackles at left lung base  XR Chest 2 Views    azithromycin (ZITHROMAX) 250 MG tablet    benzonatate (TESSALON) 200 MG capsule         Patient Instructions   There were no signs of pneumonia on your xray, but since I am hearing crackles while you are taking deep breaths I'd like to have you covered for pneumonia.       May take Tessalon Perles as needed for cough.  May also try to use Mucinex or Robitussin.    Follow up in 2 days for reevaluation of your lungs and vitals.     Please monitor symptoms carefully.  If developing chest pain, worsening shortness of breath, worsening fever, coughing up blood, extreme fatigue, or any other new, concerning symptoms, come back to clinic or go to ER immediately.

## 2021-05-31 VITALS — BODY MASS INDEX: 33.5 KG/M2 | WEIGHT: 247 LBS

## 2021-05-31 NOTE — TELEPHONE ENCOUNTER
Refill Approved    Rx renewed per Medication Renewal Policy. Medication was last renewed on 7/30/2019.  Last OV 7/19/19.    Chrissie Sin, [facility] Connection Triage/Med Refill 8/22/2019     Requested Prescriptions   Pending Prescriptions Disp Refills     albuterol (PROAIR HFA;PROVENTIL HFA;VENTOLIN HFA) 90 mcg/actuation inhaler [Pharmacy Med Name: ALBUTEROL HFA INH (200 PUFFS) 8.5GM] 8.5 g 0     Sig: INHALE 1 TO 2 PUFFS BY MOUTH EVERY 4 HOURS AS NEEDED FOR WHEEZING OR SHORTNESS OF BREATH OR COUGH       Albuterol/Levalbuterol Refill Protocol Passed - 8/22/2019  3:12 AM        Passed - PCP or prescribing provider visit in last year     Last office visit with prescriber/PCP: 7/9/2019 Elmira Link MD OR same dept: 7/17/2019 Radha Heredia PA-C OR same specialty: 7/17/2019 Radha Heredia PA-C Last physical: Visit date not found       Next appt within 3 mo: Visit date not found  Next physical within 3 mo: Visit date not found  Prescriber OR PCP: Elmira Link MD  Last diagnosis associated with med order: 1. Acute viral bronchitis  - albuterol (PROAIR HFA;PROVENTIL HFA;VENTOLIN HFA) 90 mcg/actuation inhaler [Pharmacy Med Name: ALBUTEROL HFA INH (200 PUFFS) 8.5GM]; INHALE 1 TO 2 PUFFS BY MOUTH EVERY 4 HOURS AS NEEDED FOR WHEEZING OR SHORTNESS OF BREATH OR COUGH  Dispense: 8.5 g; Refill: 0    If protocol passes may refill for 6 months if within 3 months of last provider visit (or a total of 9 months). If patient requesting >1 inhaler per month refill x 6 months and have patient make appointment with provider.

## 2021-06-01 VITALS — BODY MASS INDEX: 32.05 KG/M2 | WEIGHT: 236.6 LBS | HEIGHT: 72 IN

## 2021-06-02 VITALS — BODY MASS INDEX: 31.6 KG/M2 | WEIGHT: 233 LBS

## 2021-06-02 VITALS — HEIGHT: 72 IN | BODY MASS INDEX: 31.97 KG/M2 | WEIGHT: 236 LBS

## 2021-06-02 VITALS — WEIGHT: 228.56 LBS | BODY MASS INDEX: 31 KG/M2

## 2021-06-02 VITALS — BODY MASS INDEX: 33.09 KG/M2 | WEIGHT: 244 LBS

## 2021-06-02 VITALS — WEIGHT: 234.1 LBS | BODY MASS INDEX: 31.75 KG/M2

## 2021-06-02 VITALS — BODY MASS INDEX: 31.02 KG/M2 | WEIGHT: 228.7 LBS

## 2021-06-03 VITALS — WEIGHT: 233 LBS | BODY MASS INDEX: 31.6 KG/M2

## 2021-06-03 VITALS — BODY MASS INDEX: 31.46 KG/M2 | WEIGHT: 232 LBS

## 2021-06-07 NOTE — TELEPHONE ENCOUNTER
RN cannot approve Refill Request    RN can NOT refill this medication Protocol failed and NO refill given.       Chante Yoder, [facility] Triage/Med Refill 4/22/2020    Requested Prescriptions   Pending Prescriptions Disp Refills     TRULICITY 1.5 mg/0.5 mL PnIj [Pharmacy Med Name: TRULICITY 1.5MG/0.5ML SDP 4X0.5ML] 6 mL 0     Sig: INJECT 1.5MG UNDER THE SKIN ONCE EVERY 7 DAYS.       Insulin/GLP-1 Refill Protocol Failed - 4/21/2020 10:45 AM        Failed - Visit with PCP or prescribing provider visit in last 6 months     Last office visit with prescriber/PCP: Visit date not found OR same dept: 7/19/2019 Luan Eden MD OR same specialty: 7/19/2019 Luan Eden MD Last physical: Visit date not found Last MTM visit: Visit date not found     Next appt within 3 mo: Visit date not found  Next physical within 3 mo: Visit date not found  Prescriber OR PCP: Sobia Aviles MD  Last diagnosis associated with med order: 1. Type 2 diabetes mellitus without complication, without long-term current use of insulin (H)  - TRULICITY 1.5 mg/0.5 mL PnIj [Pharmacy Med Name: TRULICITY 1.5MG/0.5ML SDP 4X0.5ML]; INJECT 1.5MG UNDER THE SKIN ONCE EVERY 7 DAYS.  Dispense: 6 mL; Refill: 0    If protocol passes may refill for 6 months if within 3 months of last provider visit (or a total of 9 months).              Failed - A1C in last 6 months     Hemoglobin A1c   Date Value Ref Range Status   04/18/2019 6.2 (H) 3.5 - 6.0 % Final               Failed - Microalbumin in last year     Microalbumin, Random Urine   Date Value Ref Range Status   09/28/2018 0.69 0.00 - 1.99 mg/dL Final                  Failed - Creatinine done in last year     Creatinine   Date Value Ref Range Status   04/18/2019 0.85 0.70 - 1.30 mg/dL Final             Passed - Blood pressure in last year     BP Readings from Last 1 Encounters:   07/19/19 108/68

## 2021-06-09 NOTE — TELEPHONE ENCOUNTER
RN cannot approve Refill Request    RN can NOT refill this medication Protocol failed and NO refill given. Last office visit: 9/28/2017 Sobia Aviles MD Last Physical: Visit date not found Last MTM visit: Visit date not found Last visit same specialty: 7/19/2019 Luan Eden MD.  Next visit within 3 mo: Visit date not found  Next physical within 3 mo: Visit date not found      Chante Yoder, [facility] Connection Triage/Med Refill 7/15/2020    Requested Prescriptions   Pending Prescriptions Disp Refills     TRULICITY 1.5 mg/0.5 mL PnIj [Pharmacy Med Name: TRULICITY 1.5MG/0.5ML SDP 4X0.5ML] 6 mL 0     Sig: ADMINISTER 1.5 MG UNDER THE SKIN ONCE EVERY 7 DAYS.       Insulin/GLP-1 Refill Protocol Failed - 7/15/2020  3:13 AM        Failed - Visit with PCP or prescribing provider visit in last 6 months     Last office visit with prescriber/PCP: Visit date not found OR same dept: 7/19/2019 Luan Eden MD OR same specialty: 7/19/2019 Luan Eden MD Last physical: Visit date not found Last MTM visit: Visit date not found     Next appt within 3 mo: Visit date not found  Next physical within 3 mo: Visit date not found  Prescriber OR PCP: Sobia Aviles MD  Last diagnosis associated with med order: 1. Type 2 diabetes mellitus without complication, without long-term current use of insulin (H)  - TRULICITY 1.5 mg/0.5 mL PnIj [Pharmacy Med Name: TRULICITY 1.5MG/0.5ML SDP 4X0.5ML]; ADMINISTER 1.5 MG UNDER THE SKIN ONCE EVERY 7 DAYS.  Dispense: 6 mL; Refill: 0    If protocol passes may refill for 6 months if within 3 months of last provider visit (or a total of 9 months).              Failed - A1C in last 6 months     Hemoglobin A1c   Date Value Ref Range Status   04/18/2019 6.2 (H) 3.5 - 6.0 % Final               Failed - Microalbumin in last year     Microalbumin, Random Urine   Date Value Ref Range Status   09/28/2018 0.69 0.00 - 1.99 mg/dL Final                  Failed - Creatinine done in last year      Creatinine   Date Value Ref Range Status   04/18/2019 0.85 0.70 - 1.30 mg/dL Final             Passed - Blood pressure in last year     BP Readings from Last 1 Encounters:   07/19/19 108/68

## 2021-06-11 NOTE — TELEPHONE ENCOUNTER
RN cannot approve Refill Request    RN can NOT refill this medication Protocol failed and NO refill given. Last office visit: 9/28/2017 Sobia Aviles MD Last Physical: Visit date not found Last MTM visit: Visit date not found Last visit same specialty: 7/19/2019 Luan Eden MD.  Next visit within 3 mo: Visit date not found  Next physical within 3 mo: Visit date not found      Chante Yoder, [facility] Triage/Med Refill 9/25/2020    Requested Prescriptions   Pending Prescriptions Disp Refills     metFORMIN (GLUCOPHAGE) 1000 MG tablet [Pharmacy Med Name: METFORMIN 1000MG TABLETS] 180 tablet 3     Sig: TAKE 1 TABLET(1000 MG TOTAL) BY MOUTH TWICE DAILY WITH MEALS       Metformin Refill Protocol Failed - 9/23/2020  7:31 PM        Failed - Blood pressure in last 12 months     BP Readings from Last 1 Encounters:   07/19/19 108/68             Failed - LFT or AST or ALT in last 12 months     Albumin   Date Value Ref Range Status   04/12/2019 3.3 (L) 3.5 - 5.0 g/dL Final     Bilirubin, Total   Date Value Ref Range Status   04/12/2019 0.6 0.0 - 1.0 mg/dL Final     Bilirubin, Direct   Date Value Ref Range Status   04/12/2019 0.3 <=0.5 mg/dL Final     Alkaline Phosphatase   Date Value Ref Range Status   04/12/2019 132 (H) 45 - 120 U/L Final     AST   Date Value Ref Range Status   04/12/2019 23 0 - 40 U/L Final     ALT   Date Value Ref Range Status   04/12/2019 38 0 - 45 U/L Final     Protein, Total   Date Value Ref Range Status   04/12/2019 6.0 6.0 - 8.0 g/dL Final                Failed - GFR or Serum Creatinine in last 6 months     GFR MDRD Non Af Amer   Date Value Ref Range Status   04/18/2019 >60 >60 mL/min/1.73m2 Final     GFR MDRD Af Amer   Date Value Ref Range Status   04/18/2019 >60 >60 mL/min/1.73m2 Final             Failed - Visit with PCP or prescribing provider visit in last 6 months or next 3 months     Last office visit with prescriber/PCP: Visit date not found OR same dept: Visit date not found  OR same specialty: 7/19/2019 Luan Eden MD Last physical: Visit date not found Last MTM visit: Visit date not found         Next appt within 3 mo: Visit date not found  Next physical within 3 mo: Visit date not found  Prescriber OR PCP: Sobia Aviles MD  Last diagnosis associated with med order: 1. Type 2 diabetes mellitus without complication (H)  - metFORMIN (GLUCOPHAGE) 1000 MG tablet [Pharmacy Med Name: METFORMIN 1000MG TABLETS]; TAKE 1 TABLET(1000 MG TOTAL) BY MOUTH TWICE DAILY WITH MEALS  Dispense: 180 tablet; Refill: 3     If protocol passes may refill for 12 months if within 3 months of last provider visit (or a total of 15 months).           Failed - A1C in last 6 months     Hemoglobin A1c   Date Value Ref Range Status   04/18/2019 6.2 (H) 3.5 - 6.0 % Final               Failed - Microalbumin in last year      Microalbumin, Random Urine   Date Value Ref Range Status   09/28/2018 0.69 0.00 - 1.99 mg/dL Final

## 2021-06-13 NOTE — TELEPHONE ENCOUNTER
Call the pt. Due for the visit. I will not continue refilling his meds since was not seen for > 1 year. I did not see him since 2017.

## 2021-06-13 NOTE — TELEPHONE ENCOUNTER
RN cannot approve Refill Request    RN can NOT refill this medication PCP messaged that patient is overdue for Office Visit. Last office visit: 9/28/2017 Sobia Aviles MD Last Physical: Visit date not found Last MTM visit: Visit date not found Last visit same specialty: 7/19/2019 Luan Eden MD.  Next visit within 3 mo: Visit date not found  Next physical within 3 mo: Visit date not found      Amalia Pinon, [facility] Triage/Med Refill 11/14/2020    Requested Prescriptions   Pending Prescriptions Disp Refills     TRULICITY 1.5 mg/0.5 mL PnIj [Pharmacy Med Name: TRULICITY 1.5MG/0.5ML SDP 0.5ML] 6 mL 0     Sig: ADMINISTER 1.5 MG UNDER THE SKIN ONCE EVERY 7 DAYS.       Insulin/GLP-1 Refill Protocol Failed - 11/13/2020  3:14 AM        Failed - Visit with PCP or prescribing provider visit in last 6 months     Last office visit with prescriber/PCP: Visit date not found OR same dept: Visit date not found OR same specialty: 7/19/2019 Luan Eden MD Last physical: Visit date not found Last MTM visit: Visit date not found     Next appt within 3 mo: Visit date not found  Next physical within 3 mo: Visit date not found  Prescriber OR PCP: Sobia Aviles MD  Last diagnosis associated with med order: 1. Type 2 diabetes mellitus without complication, without long-term current use of insulin (H)  - TRULICITY 1.5 mg/0.5 mL PnIj [Pharmacy Med Name: TRULICITY 1.5MG/0.5ML SDP 0.5ML]; ADMINISTER 1.5 MG UNDER THE SKIN ONCE EVERY 7 DAYS.  Dispense: 6 mL; Refill: 0    If protocol passes may refill for 6 months if within 3 months of last provider visit (or a total of 9 months).              Failed - A1C in last 6 months     Hemoglobin A1c   Date Value Ref Range Status   04/18/2019 6.2 (H) 3.5 - 6.0 % Final               Failed - Microalbumin in last year     Microalbumin, Random Urine   Date Value Ref Range Status   09/28/2018 0.69 0.00 - 1.99 mg/dL Final                  Failed - Blood pressure in last  year     BP Readings from Last 1 Encounters:   07/19/19 108/68             Failed - Creatinine done in last year     Creatinine   Date Value Ref Range Status   04/18/2019 0.85 0.70 - 1.30 mg/dL Final

## 2021-06-13 NOTE — PROGRESS NOTES
Assessment/Plan:        1. Diabetes mellitus, type II  Microalbumin, Random Urine    Comprehensive Metabolic Panel    Glycosylated Hemoglobin A1c    Lipid Profile   2. Screen for colon cancer  Ambulatory referral for Colonoscopy   3. Fatigue  Thyroid Stimulating Hormone (TSH)    Vitamin D, Total (25-Hydroxy)    Vitamin B12    Iron and Transferrin Iron Binding Capacity    Testosterone, Total and Free    Magnesium     #1 patient will be scheduled for complete physical in 3-4 months.  We provided the phone number for colonoscopy and he will have a flu shot today.  2.  Diabetes mellitus type 2, hemoglobin A1c will be checked, he will continue same medications.  3.  Patient complains of bouts occasional symptoms of fatigue that happens mostly in the evening, some basic blood work will be checked today.  4.  Dyslipidemia, lipid profile will be checked today      This note has been dictated using voice recognition software. Any grammatical or context distortions are unintentional and inherent to the software.      Return in about 3 months (around 12/28/2017) for Annual physical.    There are no Patient Instructions on file for this visit.        Subjective:    Elvin Eden is a 52 y.o. male  here for    Chief Complaint   Patient presents with     Diabetes     52-year-old male who was diagnosed with diabetes 2 years ago, last visit with me for diabetes check was in February 2016, is here today for the follow-up.  He is taking glimepyride.  And metformin daily, reports that his blood sugars are between 90 and 135, denies any hypoglycemia, tolerates medications well.  He denies any other acute issues today and a complete review of system.  He is due for colonoscopy.  He does take aspirin daily.  He is not on a statin.  The last LDL was 113 in 2016 when he was supposed to start the diet and have a follow-up.    Social History     Social History     Marital status:      Spouse name: N/A     Number of children: N/A      Years of education: N/A     Occupational History     Not on file.     Social History Main Topics     Smoking status: Never Smoker     Smokeless tobacco: Never Used     Alcohol use No     Drug use: No     Sexual activity: Not on file     Other Topics Concern     Not on file     Social History Narrative       Family History   Problem Relation Age of Onset     Alcohol abuse Mother      Depression Mother      Diabetes Father      Review of Systems:     A 12 point comprehensive review of systems was negative except as noted in HPI.            Objective:    Physical Exam   /70  Pulse 68  Wt (!) 247 lb (112 kg)  BMI 33.5 kg/m2    Constitutional: oriented to person, place, and time, appears well-nourished. No distress.   HENT:   Head: Normocephalic.   Mouth/Throat: Oropharynx is clear and moist.   Eyes: Conjunctivae are normal. Pupils are equal, round, and reactive to light.   Neck: Normal range of motion. Neck supple.   Cardiovascular: Normal rate, regular rhythm and normal heart sounds.    Pulmonary/Chest: Effort normal and breath sounds normal.  Abdominal: Soft. Bowel sounds are normal.   Musculoskeletal: Normal range of motion.   Neurological: alert and oriented to person, place, and time.  Psychiatric:  normal mood and affect.    Patient Active Problem List   Diagnosis     Diabetes mellitus, type II     Eczema       Current Outpatient Prescriptions on File Prior to Visit   Medication Sig Dispense Refill     ammonium lactate (LAC-HYDRIN) 12 % lotion Apply topically as needed for dry skin.       aspirin 81 MG EC tablet Take 81 mg by mouth daily.       blood glucose meter (GLUCOMETER) Use 1 each As Directed as needed. Dispense glucometer brand per patient's insurance at pharmacy discretion. 1 each 0     blood glucose test strips Use 1 each As Directed 3 (three) times a day. Dispense brand per patient's insurance at pharmacy discretion. 100 each 11     clobetasol (TEMOVATE) 0.05 % external solution APPLY AT  BEDTIME TOPICALLY.MASSAGE INTO DRY SCALP AT BEDTIME.AVOIN FACE AND GENITALS 50 mL 0     desonide (DESOWEN) 0.05 % ointment Apply topically 2 (two) times a day.       glimepiride (AMARYL) 1 MG tablet TAKE 1 TABLET BY MOUTH EVERY MORNING. 30 tablet 0     ketoconazole (NIZORAL) 2 % shampoo Apply topically 2 (two) times a week. Apply to damp skin, lather, leave on 5 minutes, and rinse       lancets (ONETOUCH DELICA LANCETS) 33 gauge Misc Use to test blood sugar three times daily 200 each 3     lancets Misc Test three times daily 100 each 3     metFORMIN (GLUCOPHAGE) 1000 MG tablet TAKE 1 TABLET BY MOUTH TWICE DAILY WITH MEALS 60 tablet 0     triamcinolone (KENALOG) 0.1 % ointment Apply topically 2 (two) times a day.       No current facility-administered medications on file prior to visit.                Sobia Aviles  9/28/2017

## 2021-06-16 NOTE — TELEPHONE ENCOUNTER
Telephone Encounter by Donna Carter at 1/2/2019  9:02 AM     Author: Donna Carter Service: -- Author Type: --    Filed: 1/2/2019  9:04 AM Encounter Date: 12/31/2018 Status: Signed    : Donna Carter       PRIOR AUTHORIZATION DENIED    Denial Rational: Plan exclusion, not a covered medication on patient's plan          Appeal Information: No appeal information given since this is a plan exclusion.

## 2021-06-16 NOTE — TELEPHONE ENCOUNTER
RN cannot approve Refill Request    RN can NOT refill this medication PCP messaged that patient is overdue for Office Visit. Last office visit: 9/28/2017 Sboia Aviles MD Last Physical: Visit date not found Last MTM visit: Visit date not found Last visit same specialty: 7/19/2019 Luan Eden MD.  Next visit within 3 mo: Visit date not found  Next physical within 3 mo: Visit date not found      Amalia Pinon, [facility] Triage/Med Refill 4/24/2021    Requested Prescriptions   Pending Prescriptions Disp Refills     TRULICITY 1.5 mg/0.5 mL PnIj [Pharmacy Med Name: TRULICITY 1.5MG/0.5ML SDP 0.5ML] 6 mL 0     Sig: ADMINISTER 1.5 MG UNDER THE SKIN ONCE EVERY 7 DAYS.       Insulin/GLP-1 Refill Protocol Failed - 4/24/2021  3:16 AM        Failed - Visit with PCP or prescribing provider visit in last 6 months     Last office visit with prescriber/PCP: Visit date not found OR same dept: Visit date not found OR same specialty: 7/19/2019 Luan Eden MD Last physical: Visit date not found Last MTM visit: Visit date not found     Next appt within 3 mo: Visit date not found  Next physical within 3 mo: Visit date not found  Prescriber OR PCP: Sobia Aviles MD  Last diagnosis associated with med order: 1. Type 2 diabetes mellitus without complication, without long-term current use of insulin (H)  - TRULICITY 1.5 mg/0.5 mL PnIj [Pharmacy Med Name: TRULICITY 1.5MG/0.5ML SDP 0.5ML]; ADMINISTER 1.5 MG UNDER THE SKIN ONCE EVERY 7 DAYS.  Dispense: 6 mL; Refill: 0    If protocol passes may refill for 6 months if within 3 months of last provider visit (or a total of 9 months).              Failed - A1C in last 6 months     Hemoglobin A1c   Date Value Ref Range Status   04/18/2019 6.2 (H) 3.5 - 6.0 % Final               Failed - Microalbumin in last year     Microalbumin, Random Urine   Date Value Ref Range Status   09/28/2018 0.69 0.00 - 1.99 mg/dL Final                  Failed - Blood pressure in last  year     BP Readings from Last 1 Encounters:   07/19/19 108/68             Failed - Creatinine done in last year     Creatinine   Date Value Ref Range Status   04/18/2019 0.85 0.70 - 1.30 mg/dL Final

## 2021-06-17 NOTE — PROGRESS NOTES
Progress Note    Reason for Visit:  Chief Complaint     low testosterone          Progress Note:    HPI:     This patient seen in consultation at the request of the primary care physician because of hypogonadism.  Thank you for referring this pleasant 52-year-old male patient who was diagnosed with type 2 diabetes 3 years ago after debridement developing rapid deterioration of his vision.    A1c at the diagnosis was 9.9.    Family history his father was diabetic.    The patient also complains of erectile dysfunction and loss of libido for over 6 years.    He is  with 2 children that are 4 years and 5 months old.    He took Cialis for a short period of time and he had a positive response.    Is not complaining of any lumps or masses in his testicles.    There is no family history of prostate cancer or personal history of prostate problems.    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    He has no microvascular complications.      Component      Latest Ref Rng & Units 2/12/2016 9/28/2017   Triglycerides      <=149 mg/dL  213 (H)   Cholesterol      <=199 mg/dL  182   LDL Calculated      <=129 mg/dL  111   HDL Cholesterol      >=40 mg/dL  28 (L)   Patient Fasting > 8hrs?        No   Microalbumin, Random Urine      0.00 - 1.99 mg/dL  0.61   Creatinine, Urine      mg/dL  131.5   Microalbumin/Creatinine Ratio Random Urine      <=19.9 mg/g  4.6   TESTOSTERONE, FREE, S      4.06 - 15.6 ng/dL  6.77   Testosterone, Total      240 - 950 ng/dL  199 (L)   Hemoglobin A1c      3.5 - 6.0 % 5.9 6.9 (H)   TSH      0.30 - 5.00 uIU/mL 1.59 1.33   Vitamin D, Total (25-Hydroxy)      30.0 - 80.0 ng/mL  44.1   Vitamin B-12      213 - 816 pg/mL  355   Magnesium      1.8 - 2.6 mg/dL  2.1         Review of Systems:    Nervous System: No headache, dizziness, fainting or memory loss. No tingling sensation of hand or feet.  Ears: No hearing loss or ringing in the ears  Eyes: No blurring of vision, redness, itching or dryness.  Nose: No  nosebleed or loss of smell  Mouth: No mouth sores or loss of taste  Throat: No hoarseness or difficulty swallowing  Neck: No enlarged thyroid or lymph nodes.  Heart: No chest pain, palpitation or irregular heartbeat. No swelling of hands or feet  Lungs: No shortness of breath, cough, night sweats, wheezing or hemoptysis.  Gastrointestinal: No nausea or vomiting, constipation or diarrhea.  No acid reflux, abdominal pain or blood in stools.  Kidney/Bladdr: No polyuria, polydipsia, nocturia or hematuria.  Genital/Sexual: No loss of libido  Skin: No rash, hair loss or hirsutism.  No abnormal striae  Muscles/Joints/Bones: No morning stiffness, muscle aches and pain or loss of height.    Current Medications:  Current Outpatient Prescriptions   Medication Sig     ammonium lactate (LAC-HYDRIN) 12 % lotion Apply topically as needed for dry skin.     atorvastatin (LIPITOR) 10 MG tablet Take 1 tablet (10 mg total) by mouth daily.     blood glucose meter (GLUCOMETER) Use 1 each As Directed as needed. Dispense glucometer brand per patient's insurance at pharmacy discretion.     blood glucose test strips Use 1 each As Directed 3 (three) times a day. Dispense brand per patient's insurance at pharmacy discretion.     clobetasol (TEMOVATE) 0.05 % external solution APPLY AT BEDTIME TOPICALLY.MASSAGE INTO DRY SCALP AT BEDTIME.AVOIN FACE AND GENITALS     desonide (DESOWEN) 0.05 % ointment Apply topically 2 (two) times a day.     glimepiride (AMARYL) 1 MG tablet TAKE 1 TABLET BY MOUTH EVERY MORNING.     lancets (ONETOUCH DELICA LANCETS) 33 gauge Misc Use to test blood sugar three times daily     lancets Misc Test three times daily     metFORMIN (GLUCOPHAGE) 1000 MG tablet TAKE 1 TABLET BY MOUTH TWICE DAILY WITH MEALS     triamcinolone (KENALOG) 0.1 % ointment Apply topically 2 (two) times a day.       Patients Active Problems:  Patient Active Problem List   Diagnosis     Diabetes mellitus, type II     Eczema       History:   reports  that he has never smoked. He has never used smokeless tobacco. He reports that he does not drink alcohol or use illicit drugs.   reports that he has never smoked. He has never used smokeless tobacco. He reports that he does not drink alcohol or use illicit drugs.  History   Smoking Status     Never Smoker   Smokeless Tobacco     Never Used      reports that he has never smoked. He has never used smokeless tobacco. He reports that he does not drink alcohol or use illicit drugs.  History   Sexual Activity     Sexual activity: Not on file     No past medical history on file.  Family History   Problem Relation Age of Onset     Alcohol abuse Mother      Depression Mother      Diabetes Father      No past medical history on file.  No past surgical history on file.    Vitals   height is 6' (1.829 m) and weight is 236 lb 9.6 oz (107.3 kg) (abnormal). His blood pressure is 112/74.         Exam  General appearance: The patient looked well, not in acute distress.  Eyes: no evidence of thyroid eye disease.   Retinal exam: No evidence of diabetic retinopathy.  Mouth and Throat: Normal  Neck: No evidence of thyromegaly, enlarged lymph node or tenderness  Chest: Trachea is central. Chest is clear to auscultation and percussion. Breat sounds are normal.  Cardiovascular exam: JVP is not raised. Heart sounds are normal, no murmurs or rub  Peripheral pulses are palpable.   Abdomen: No masses or tenderness.    Back: No vertebral tenderness or kyphosis.  Extremities: No evidence of leg edema.   Skin: Normal to touch.  No abnormal striae  Neurologic exam:  Visual fields are intact by confrontation, grossly intact. No evidence of peripheral neuropathy.  Detailed foot exam normal.        Diagnosis:  No diagnosis found.    Orders:   No orders of the defined types were placed in this encounter.        Assessment and Plan: Hypogonadism his total testosterone is 199    The patient is complaining of erectile dysfunction and loss of libido.    I  will repeat the test he is denying any headaches or testicular tumor I examined his testicle normal size of the mid testicle 15 mL no masses or lumps.    I discussed the pros and cons of replacement with the patient I would check his PSA if the level came back low that I would put him on 100 mg IM every 2 weeks.  I will check his LH and FSH and ferritin level looking for secondary causes of hypogonadism which is very unlikely in this case.    Hyperlipidemia on Lipitor 10 mg we will check that.    Type 2 diabetes on Amaryl 1 mg once a day metformin 1000 mg twice a day last A1c 6.9 we will repeat that he has not been checking his blood sugar but when he does before breakfast is 150 so I told him this is not showing good control I really do not want to see fasting blood sugar more than 110.    Detailed foot exam shows dry skin but otherwise normal.    Patient return to clinic in 6 month.    I have reviewed and ordered clinical lab test    I have reviewed and ordered radiology tests.    I have reviewed and ordered her medication as required.    I have reviewed her test results and advised with the performing physician.    I have reviewed the patient's old records.    I have reviewed and summarized the patient old records.    I did spend 60 minutes with the patient more than 50% was spent on counseling and managing her care.

## 2021-06-17 NOTE — PATIENT INSTRUCTIONS - HE
Patient Instructions by Elmira Link MD at 7/9/2019  1:40 PM     Author: Elmira Link MD Service: -- Author Type: Physician    Filed: 7/9/2019  2:41 PM Encounter Date: 7/9/2019 Status: Addendum    : Elmira Link MD (Physician)    Related Notes: Original Note by Elmira Link MD (Physician) filed at 7/9/2019  2:40 PM       1. Keep well hydrated  2. May alternate Tylenol every 6 hours with ibuprofen every 6 hours as needed for fever or pain  3. If follow up is needed, try and be seen at your primary clinic. Or you can be seen by any primary care provider at one of our other [facility] sites  4. If you have any questions, call the clinic number  - it's answered 24/7    Patient Education     Viral Bronchitis (Adult)    You have a viral bronchitis. Bronchitis is inflammation and swelling of the lining of the lungs. This is often caused by an infection. Symptoms include a dry, hacking cough that is worse at night. The cough may bring up yellow-green mucus. You may also feel short of breath or wheeze. Other symptoms may include tiredness, chest discomfort, and chills.  Bronchitis that is caused by a virus is not treated with antibiotics. Instead, medicines may be given to help relieve symptoms. Symptoms can last up to 2 weeks, although the cough may last much longer.  This illness is contagious during the first few days and is spread through the air by coughing and sneezing, or by direct contact (touching the sick person and then touching your own eyes, nose, or mouth).  Most viral illnesses resolve within 10 to 14 days with rest and simple home remedies, although they may sometimes last for several weeks.  Home care    If symptoms are severe, rest at home for the first 2 to 3 days. When you go back to your usual activities, don't let yourself get too tired.    Do not smoke. Also avoid being exposed to secondhand smoke.    You may use over-the-counter medicine to control fever or pain, unless  another pain medicine was prescribed. If you have chronic liver or kidney disease or have ever had a stomach ulcer or gastrointestinal bleeding, talk with your healthcare provider before using these medicines. Also talk to your provider if you are taking medicine to prevent blood clots. Aspirin should never be given to anyone younger than 18 years of age who is ill with a viral infection or fever. It may cause severe liver or brain damage.    Your appetite may be poor, so a light diet is fine. Avoid dehydration by drinking 6 to 8 glasses of fluids per day (such as water, soft drinks, sports drinks, juices, tea, or soup). Extra fluids will help loosen secretions in the nose and lungs.    Over-the-counter cough, cold, and sore-throat medicines will not shorten the length of the illness, but they may help to reduce symptoms. Don't use decongestants if you have high blood pressure.  Follow-up care  Follow up with your healthcare provider, or as advised. If you had an X-ray or ECG (electrocardiogram), a specialist will review it. You will be notified of any new findings that may affect your care.  If you are age 65 or older, or if you have a chronic lung disease or condition that affects your immune system, or you smoke, ask your healthcare provider about getting a pneumococcal vaccine and a yearly flu shot (influenza vaccine).  When to seek medical advice  Call your healthcare provider right away if any of these occur:    Fever of 100.4 F (38 C) or higher, or as directed by your healthcare provider    Coughing up increased amounts of colored sputum    Weakness, drowsiness, headache, facial pain, ear pain, or a stiff neck  Call 911  Call 911 if any of these occur:    Coughing up blood    Worsening weakness, drowsiness, headache, or stiff neck    Trouble breathing, wheezing, or pain with breathing  Date Last Reviewed: 9/13/2015 2000-2017 [employer]. [address] [place] [address], [place] [address]. All rights  reserved. This information is not intended as a substitute for professional medical care. Always follow your healthcare professional's instructions.

## 2021-06-19 NOTE — LETTER
Letter by Sobia Aviles MD at      Author: Sobia Aviles MD Service: -- Author Type: --    Filed:  Encounter Date: 9/4/2019 Status: (Other)         Elvin Eden  [address] [facility] [address]          September 4, 2019      Dear Mr. Eden,    This letter is to inform you that according to our records, you are overdue for a diabetic opthalmology exam. This is an exam recommended yearly to rule out diabetic retinopathy. If you have had this exam done, please contact us with the updated information of when and where your last visit was performed. Otherwise, please schedule this eye exam and remind your eye doctor to fax this information to our clinic so we can update your chart at [phone].        Sincerely,        Electronically signed by Sobia Aviles MD

## 2021-06-19 NOTE — LETTER
Letter by Sobia Aviles MD at      Author: Sobia Aviles MD Service: -- Author Type: --    Filed:  Encounter Date: 8/27/2019 Status: (Other)         Elvin Eedn  [address] [facility] [address]          August 27, 2019      Dear Mr. Eden,    This is a friendly reminder that you are due for a diabetic follow up appointment in October. It is important for us to meet to discuss your medications and treatment plan to make sure your medical problems are being appropriately treated.  Please make an appointment with me by calling the clinic at [phone] or by scheduling via [employer]. If you have any questions or concerns, please let me know.      Sincerely,        Electronically signed by Sobia Aviles MD

## 2021-06-19 NOTE — LETTER
Letter by Sobia Aviles MD at      Author: Sobia Aviles MD Service: -- Author Type: --    Filed:  Encounter Date: 12/4/2019 Status: Signed         Elvin Eden  [address] [place] [address]      Ana Rosa Oconnor,     Just a friendly reminder that it's time for your diabetic eye exam    You can make an appointment at your eye clinic and have the result's faxed    To us here at the [facility] so we can update your record's.      Thank You,  PATRICIA Brady.

## 2021-06-19 NOTE — LETTER
Letter by Sobia Aviles MD at      Author: Sobia Aviles MD Service: -- Author Type: --    Filed:  Encounter Date: 8/21/2019 Status: (Other)         Elvin Eden  [address] [facility] [address]             August 21, 2019         Dear Mr. Eden,    Our records show that you are due for a colonoscopy.  We do want to inform you that there are a couple of other options besides the traditional colonoscopy that we offer.  If you would like to do the traditional colonoscopy, please contact a [facility] near you according to the card enclosed.  If you would like to do one of the other options available to you, please let you primary doctor know and they will get that ordered.  Enclosed is some information on the other options for you to read over.  If you have had any of these done at another facility, please arrange for us to receive those records so we can update your chart.    Please call with questions or contact us using [employer].    Sincerely,        Electronically signed by Sobia Aviles MD

## 2021-06-20 NOTE — PROGRESS NOTES
Clinic Note    Assessment:     Assessment and Plan:  1. Diabetes mellitus, type II (H)  Refills of his metformin and glimepiride were given today.  He is quite hesitant about making changes to his medications.  Plan is to recheck hemoglobin A1c, as well as other labs below.  He is motivated to make changes in his diet and to follow-up with us in 3 months for recheck of his lab work.  See patient instructions below for plan of care.  - Glycosylated Hemoglobin A1c  - Comprehensive Metabolic Panel  - Lipid Profile  - Microalbumin, Random Urine    Patient Instructions   We are going to re-check a hemoglobin a1c today.  I am also going to recheck your kidney function, and get a urine sample.  Because you are fasting, I am going to check your cholesterol levels as well.    It sounds like you are not paying close attention to your eating habits.  Try to minimize the amounts of carbohydrates that you are taking it.  It would be a good idea to eat more fruits and vegetables, and less fast food.  Increase your physical activity, and try to lose 10 pounds over the next 3 months.    I will call you early next week with the results.  We will not make any changes to your medications today, but let us plan on seeing you back in 3 months for another recheck of your labs.    Try checking your blood sugars at least twice per day.  Check once in the morning while fasting.  This number needs to be less than 130.  Check again 2 hours after eating a meal, this number needs to be less than 180.    No Follow-up on file.         Subjective:      Patient comes the clinic today for follow-up of his diabetes.    Patient was last seen by his PCP, Dr. Aviles on 9/28/2017, exactly 1 year ago.  His hemoglobin A1c at that time was 6.9; he has since had his hemoglobin A1c rechecked at an appointment with endocrinology for his testosterone levels-that hemoglobin A1c was elevated at 7.3.    Patient is currently taking metformin 1000 mg twice daily.   He is also on glimepiride 2 mg every morning.    Patient states that he had a diabetic eye check over this past summer.  He says that it was initially discovered that he had diabetes due to some issues with his vision for years ago.    He denies any issues with neuropathy.  No issues with urination.  He has not been paying much attention to his diet.  Says that he eats when these a few times per week; in general, he usually eats whatever is readily available to him.  Fortunately, he is not drinking any soda or juice.  Patient states that he has seen the diabetic education team for years ago when he was initially diagnosed with diabetes.    He has not been using his metformin over the last 3 days because he was told he needed to schedule an appointment with us before refills will be given.  As a result, his fasting blood sugar this morning was a bit elevated-says it was 160.  However, in general, he says that he does not check his blood sugars very often.    The following portions of the patient's history were reviewed and updated as appropriate: Allergies, medications, problems, prior note.    Review of Systems:    Review is otherwise negative except for what is mentioned above.     Social Hx:    History   Smoking Status     Never Smoker   Smokeless Tobacco     Never Used         Objective:     Vitals:    09/28/18 0837   BP: 118/76   Pulse: 77   SpO2: 97%   Weight: (!) 244 lb (110.7 kg)       Exam:    General: No apparent distress. Calm. Alert and Oriented X3. Pt behavior is appropriate.      Patient Active Problem List   Diagnosis     Diabetes mellitus, type II (H)     Eczema     Current Outpatient Prescriptions   Medication Sig Dispense Refill     ammonium lactate (LAC-HYDRIN) 12 % lotion Apply topically as needed for dry skin.       atorvastatin (LIPITOR) 10 MG tablet Take 1 tablet (10 mg total) by mouth daily. 90 tablet 3     blood glucose meter (GLUCOMETER) Use 1 each As Directed as needed. Dispense glucometer  brand per patient's insurance at pharmacy discretion. 1 each 0     blood glucose test strips Use 1 each As Directed 3 (three) times a day. Dispense brand per patient's insurance at pharmacy discretion. 100 each 11     clobetasol (TEMOVATE) 0.05 % external solution APPLY AT BEDTIME TOPICALLY.MASSAGE INTO DRY SCALP AT BEDTIME.AVOIN FACE AND GENITALS 50 mL 0     desonide (DESOWEN) 0.05 % ointment Apply topically 2 (two) times a day.       glimepiride (AMARYL) 1 MG tablet Take 2 tablets (2 mg total) by mouth every morning. 180 tablet 3     lancets (ONETOUCH DELICA LANCETS) 33 gauge Misc Use to test blood sugar three times daily 200 each 3     lancets Misc Test three times daily 100 each 3     metFORMIN (GLUCOPHAGE) 1000 MG tablet Take 1 tablet (1,000 mg total) by mouth 2 (two) times a day with meals. 60 tablet 0     triamcinolone (KENALOG) 0.1 % ointment Apply topically 2 (two) times a day.       No current facility-administered medications for this visit.        I spent 25 minutes with patient face to face, of which >50% was counseling regarding the above plan       Luis E Velázquez (Rob), ANH    9/28/2018

## 2021-06-21 NOTE — PROGRESS NOTES
Progress Note    Reason for Visit:  Chief Complaint     Diabetes Mellitus; testosterone          Progress Note:    HPI: This patient is here for follow-up because of possible hypogonadism    Review of Systems:    Nervous System: No headache, dizziness, fainting or memory loss. No tingling sensation of hand or feet.  Ears: No hearing loss or ringing in the ears  Eyes: No blurring of vision, redness, itching or dryness.  Nose: No nosebleed or loss of smell  Mouth: No mouth sores or loss of taste  Throat: No hoarseness or difficulty swallowing  Neck: No enlarged thyroid or lymph nodes.  Heart: No chest pain, palpitation or irregular heartbeat. No swelling of hands or feet  Lungs: No shortness of breath, cough, night sweats, wheezing or hemoptysis.  Gastrointestinal: No nausea or vomiting, constipation or diarrhea.  No acid reflux, abdominal pain or blood in stools.  Kidney/Bladdr: No polyuria, polydipsia, nocturia or hematuria.  Genital/Sexual: No loss of libido  Skin: No rash, hair loss or hirsutism.  No abnormal striae  Muscles/Joints/Bones: No morning stiffness, muscle aches and pain or loss of height.    Current Medications:  Current Outpatient Prescriptions   Medication Sig     ammonium lactate (LAC-HYDRIN) 12 % lotion Apply topically as needed for dry skin.     blood glucose meter (GLUCOMETER) Use 1 each As Directed as needed. Dispense glucometer brand per patient's insurance at pharmacy discretion.     blood glucose test strips Use 1 each As Directed 3 (three) times a day. Dispense brand per patient's insurance at pharmacy discretion. (Patient taking differently: Use 1 each As Directed 3 (three) times a day. Check BS couple times a week. Dispense brand per patient's insurance at pharmacy discretion.)     clobetasol (TEMOVATE) 0.05 % external solution APPLY AT BEDTIME TOPICALLY.MASSAGE INTO DRY SCALP AT BEDTIME.AVOIN FACE AND GENITALS     desonide (DESOWEN) 0.05 % ointment Apply topically 2 (two) times a day.      lancets (ONETOUCH DELICA LANCETS) 33 gauge Misc Use to test blood sugar three times daily     lancets Misc Test three times daily     metFORMIN (GLUCOPHAGE) 1000 MG tablet Take 1 tablet (1,000 mg total) by mouth 2 (two) times a day with meals.     triamcinolone (KENALOG) 0.1 % ointment Apply topically 2 (two) times a day.     atorvastatin (LIPITOR) 10 MG tablet Take 1 tablet (10 mg total) by mouth daily.     glimepiride (AMARYL) 1 MG tablet Take 2 tablets (2 mg total) by mouth every morning.       Patients Active Problems:  Patient Active Problem List   Diagnosis     Diabetes mellitus, type II (H)     Eczema       History:   reports that he has never smoked. He has never used smokeless tobacco. He reports that he does not drink alcohol or use illicit drugs.   reports that he has never smoked. He has never used smokeless tobacco. He reports that he does not drink alcohol or use illicit drugs.  History   Smoking Status     Never Smoker   Smokeless Tobacco     Never Used      reports that he has never smoked. He has never used smokeless tobacco. He reports that he does not drink alcohol or use illicit drugs.  History   Sexual Activity     Sexual activity: Not on file     No past medical history on file.  Family History   Problem Relation Age of Onset     Alcohol abuse Mother      Depression Mother      Diabetes Father      No past medical history on file.  No past surgical history on file.    Vitals   height is 6' (1.829 m) and weight is 236 lb (107 kg) (abnormal). His blood pressure is 120/78.         Exam  General appearance: The patient looked well, not in acute distress.  Eyes: no evidence of thyroid eye disease.   Retinal exam: No evidence of diabetic retinopathy.  Mouth and Throat: Normal  Neck: No evidence of thyromegaly, enlarged lymph node or tenderness  Chest: Trachea is central. Chest is clear to auscultation and percussion. Breat sounds are normal.  Cardiovascular exam: JVP is not raised. Heart sounds are  normal, no murmurs or rub  Peripheral pulses are palpable.   Abdomen: No masses or tenderness.    Back: No vertebral tenderness or kyphosis.  Extremities: No evidence of leg edema.   Skin: Normal to touch.  No abnormal striae  Neurologic exam:  Visual fields are intact by confrontation, grossly intact. No evidence of peripheral neuropathy.  Detailed foot exam normal.        Diagnosis:  No diagnosis found.    Orders:   No orders of the defined types were placed in this encounter.        Assessment and Plan: Possible hypogonadism.  His testosterone was normal at 238 on more than one occasion.  The patient is not keen on testosterone replacement so we will not going to do it is LH and FSH are 2.9 and 5.6 respectively which is within normal range.  Which indicates that he does not have hypoglycemia hypogonadism.    He takes Cialis if required and it does work for him will continue with his PSA is 1.4.    He has type 2 diabetes on Amaryl 2 mg daily metformin 1000 mg twice a day A1c is 7 he does not check his blood sugar will put him on we will give him a glucometer but I also I did advise him to stop Amaryl and we will put him on Victoza 0.6 mg for a week then go up to one-point discussed side effects with the patient in details return to clinic in 6 months    I have reviewed and ordered clinical lab test    I have reviewed and ordered radiology tests.    I have reviewed and ordered her medication as required.    I have reviewed her test results and advised with the performing physician.    I have reviewed the patient's old records.    I have reviewed and summarized the patient old records.    I did spend 40 minutes with the patient more than 50% was spent on counseling and managing her care.  This patient is here for follow-up because of possible

## 2021-06-21 NOTE — PROGRESS NOTES
Clinic Note    Assessment:     Assessment and Plan:  1. Type 2 diabetes mellitus without complication, without long-term current use of insulin (H)  He had issues with Gamaliel toes.  He is interested in an alternative due to the fact that the Victoza helped him with weight loss and appeared to be helping tremendously with his glucometer measurements.  Unfortunately, he had a reaction to the Victoza.  He may be a candidate for either Trulicity or Ozempic.  We will have him meet with our clinical pharmacist to discuss alternatives for him.  - Ambulatory referral to Medication Management    2. Screen for colon cancer  - Ambulatory referral for Colonoscopy       Patient Instructions   We will have you meet with our clinical pharmacist, Daniela Wright, Pharm.D.  She will review your diabetes medications with you and come up with an alternative solution to the Victoza.    Her blood pressure looks excellent today.    Your hives and skin looked much better.    Follow-up with either I or Dr. Aviles in 1 month for a recheck of your hemoglobin A1c.    Return in about 1 month (around 12/26/2018).         Subjective:      Patient presents to clinic today for follow-up of some urticaria as a result of a medication reaction.    Patient saw his endocrinologist 1 month ago.  At that time he was originally seen for hypogonadism.  He was switched from Amaryl to Gamaliel toes at that time for his type 2 diabetes mellitus.    He called into the nurse triage line 3 days ago stating that he was having issues with hives.  He been trying Benadryl without much success.    He was told to come to our walk-in clinic to be evaluated.  I do not have a note from that encounter.  He was told to discontinue the Victoza.  Resume Amaryl.  He was prescribed prednisone and told to follow-up with his endocrinologist in the next 2-4 weeks.    He finished his course of prednisone as instructed.  His skin is much better.  No more hives.  No more itching he denies  any issues with swallowing or breathing.  No chest pain or shortness of breath.    The following portions of the patient's history were reviewed and updated as appropriate: Allergies, medications, problems, prior note.    Review of Systems:    Review is otherwise negative except for what is mentioned above.     Social Hx:    Social History     Tobacco Use   Smoking Status Never Smoker   Smokeless Tobacco Never Used         Objective:     Vitals:    11/26/18 1003   Weight: (!) 228 lb 11.2 oz (103.7 kg)       Exam:    General: No apparent distress. Calm. Alert and Oriented X3. Pt behavior is appropriate.  Chest/Lungs: Normal chest wall, clear to auscultation, normal respiratory effort and rate.   Heart/Pulses: Regular rate and rhythm, strong and equal radial pulses, no murmurs. Capillary refill <2 seconds. No edema.   Musculoskeletal: No CVA tenderness with palpation. Good ROM with extremities.   Neurologic: Interactive, alert, no focal findings, CNs intact.   Skin: Warm, dry. Normal hair pattern. Free of lesions. Normal skin turgor.       Patient Active Problem List   Diagnosis     Diabetes mellitus, type II (H)     Eczema     Current Outpatient Medications   Medication Sig Dispense Refill     ammonium lactate (LAC-HYDRIN) 12 % lotion Apply topically as needed for dry skin.       atorvastatin (LIPITOR) 10 MG tablet Take 1 tablet (10 mg total) by mouth daily. 90 tablet 3     blood glucose meter (GLUCOMETER) Use 1 each As Directed as needed. Dispense glucometer brand per patient's insurance at pharmacy discretion.  0     blood glucose test strips Use 3 each As Directed daily. Dispense brand per patient's insurance at pharmacy discretion. 300 strip 1     clobetasol (TEMOVATE) 0.05 % external solution APPLY AT BEDTIME TOPICALLY.MASSAGE INTO DRY SCALP AT BEDTIME.AVOIN FACE AND GENITALS 50 mL 0     desonide (DESOWEN) 0.05 % ointment Apply topically 2 (two) times a day.       generic lancets (FINGERSTIX LANCETS) Use 3 each  "As Directed daily. Dispense brand per patient's insurance at pharmacy discretion. 300 each 1     glimepiride (AMARYL) 1 MG tablet Take 2 tablets (2 mg total) by mouth every morning. 60 tablet 0     metFORMIN (GLUCOPHAGE) 1000 MG tablet Take 1 tablet (1,000 mg total) by mouth 2 (two) times a day with meals. 60 tablet 0     pen needle, diabetic (BD ULTRA-FINE GUMARO PEN NEEDLE) 32 gauge x 5/32\" Ndle Use once daily. 100 each 1     predniSONE (DELTASONE) 10 mg tablet Take 40mg x 3 days, then 30mg x 3 days, then 20mg x 3 days, then 10mg x 3 days. 30 tablet 0     triamcinolone (KENALOG) 0.1 % ointment Apply topically 2 (two) times a day.       No current facility-administered medications for this visit.        I spent 30 minutes with patient face to face, of which >50% was counseling regarding the above plan       Luis E Velázquez (Rob), ANH    11/26/2018           "

## 2021-06-22 NOTE — TELEPHONE ENCOUNTER
Changed to Trulicity 0.75 mg SQ weekly. Fully covered. Called and informed patient. He will stop glimepiride and start Trulicity. He will follow up with MTM in 1 month.

## 2021-06-22 NOTE — TELEPHONE ENCOUNTER
Fine if the GLP-1 agonist did not work may be should go back to [facility].  I have not seen this patient for a long time as well.

## 2021-06-22 NOTE — TELEPHONE ENCOUNTER
Per your last note Dr. Newberry:     He has type 2 diabetes on Amaryl 2 mg daily metformin 1000 mg twice a day A1c is 7 he does not check his blood sugar will put him on we will give him a glucometer but I also I did advise him to stop Amaryl and we will put him on Victoza 0.6 mg for a week then go up to one-point discussed side effects with the patient in details return to clinic in 6 months      He developed rash to Victoza so we are trying an alternative GLP-1 agonist.    70

## 2021-06-22 NOTE — PROGRESS NOTES
MT[facility] Initial Encounter  Assessment & Plan                                                     1. Type 2 diabetes mellitus   Blood sugars and weight responded well to Victoza, but unfortunately he developed allergic reaction of hives.  This has resolved since stopping the medication and using course of prednisone.  He would like to try an alternative GLP-1 agonist, which I think is reasonable.  Skin eruption seemed to appear when he increased the dose of Victoza.  So we will start alternative GLP-1 agonist but remain on lower dose and assess how blood sugars respond.  He is aware to stop the medication if he notices any itching, rash symptoms. Medication education and counseling was provided, including indication, expected effect, dosing instructions, and possible side effects. Showed patient appropriate injection sites and advised using the top of the thigh or abdomen and not the side of his hip as he was doing before. The patient's questions were answered.   Blood pressure is well controlled and meeting goal of <140/90 mm Hg per JNC-8 hypertension guidelines. No antihypertensives. No microalbuminuria.   Calculated 10-year ASCVD risk is less than 10%, so no need for low-dose aspirin for primary prevention at this time.  Will reevaluate in the future.  Plan   1. Start Ozempic. Inject 0.25 mg once a week.  2. Influenza vaccine.     2. Hyperlipidemia  Noncompliance with statin. He would benefit from moderate intensity statin given age and diabetes diagnosis per ACC/AHA guidelines. He agrees to restart. Refill sent.   Plan   1. Restart atorvastatin 10 mg daily.       Follow Up  Return in about 1 month (around 12/28/2018).      Subjective & Objective                                                     Elvin Eden is a 53 y.o. male coming in for an initial visit for Medication Therapy Management. He was referred to me from Sobia Aviles MD    Chief Complaint: Alternative to Victoza - which caused hives.    Medication Adherence/Access: Nonadherence to atorvastatin, as discussed below.     Elvin is taking metformin 1000 mg two times a day. He was on Victoza, but this caused hives.  The symptoms seem to appear when he increased the dose.  He stopped the medication, but the rash persisted for 10 days so he was seen and required course of steroids. Blood sugars have been erratic on prednisone.  He does say he has eczema and tends to have dry sensitive skin.  Not following correct administration technique and was injecting in his lower hip region.  He liked Victoza as he lost 12 pounds and BG were as low as 80-90 on Victoza. He has been on glimepiride in the past.   He hasn't been taking atorvastatin 10 mg daily. He says he barely recalls taking this in the past and is not sure what it is for.   He would like his influenza vaccin today.     Lab Results   Component Value Date    HGBA1C 7.0 (H) 09/28/2018    HGBA1C 7.3 (H) 04/24/2018    HGBA1C 6.9 (H) 09/28/2017     Lab Results   Component Value Date    MICROALBUR 0.69 09/28/2018    LDLCALC 127 09/28/2018    CREATININE 0.89 10/18/2018     The 10-year ASCVD risk score (Elisaofelia DOUGHERTY Jr., et al., 2013) is: 9.3%    Values used to calculate the score:      Age: 53 years      Sex: Male      Is Non- : No      Diabetic: Yes      Tobacco smoker: No      Systolic Blood Pressure: 120 mmHg      Is BP treated: No      HDL Cholesterol: 30 mg/dL      Total Cholesterol: 179 mg/dL      PMH: reviewed in EPIC   Allergies/ADRs: reviewed in EPIC   Alcohol: reviewed in EPIC   Tobacco:   Social History     Tobacco Use   Smoking Status Never Smoker   Smokeless Tobacco Never Used     Today's Vitals:   BP Readings from Last 3 Encounters:   11/26/18 120/72   11/23/18 124/80   10/25/18 120/78     Pulse Readings from Last 3 Encounters:   11/26/18 66   11/23/18 95   09/28/18 77     Wt Readings from Last 3 Encounters:   11/26/18 (!) 228 lb 11.2 oz (103.7 kg)   11/23/18 (!) 228 lb 11.2  "oz (103.7 kg)   10/25/18 (!) 236 lb (107 kg)     ----------------    Much or all of the text in this note was generated through the use of Dragon Dictate voice-to-text software. Errors in spelling or words which seem out of context are unintentional. Sound alike errors, in particular, may have escaped editing.    The patient was given a summary of these recommendations as an after visit summary    I spent 45 minutes with this patient today.   All changes were made via collaborative practice agreement with Sobia Aviles MD. A copy of the visit note was provided to the patient's provider.     Daniela Rust, PharmD, BCACP  Medication Management Pharmacist  [facility]        Current Outpatient Medications   Medication Sig Dispense Refill     atorvastatin (LIPITOR) 10 MG tablet Take 1 tablet (10 mg total) by mouth daily. 90 tablet 3     blood glucose test strips Use 3 each As Directed daily. Dispense brand per patient's insurance at pharmacy discretion. 300 strip 1     clobetasol (TEMOVATE) 0.05 % external solution APPLY AT BEDTIME TOPICALLY.MASSAGE INTO DRY SCALP AT BEDTIME.AVOIN FACE AND GENITALS 50 mL 0     generic lancets (FINGERSTIX LANCETS) Use 3 each As Directed daily. Dispense brand per patient's insurance at pharmacy discretion. 300 each 1     metFORMIN (GLUCOPHAGE) 1000 MG tablet Take 1 tablet (1,000 mg total) by mouth 2 (two) times a day with meals. 60 tablet 0     pen needle, diabetic (BD ULTRA-FINE GUMARO PEN NEEDLE) 32 gauge x 5/32\" Ndle Use once daily. 100 each 1     predniSONE (DELTASONE) 10 mg tablet Take 40mg x 3 days, then 30mg x 3 days, then 20mg x 3 days, then 10mg x 3 days. 30 tablet 0     semaglutide (OZEMPIC) 0.25 mg or 0.5 mg(2 mg/1.5 mL) PnIj Inject 0.25 mg under the skin every 7 days. 1 Syringe 1     No current facility-administered medications for this visit.              "

## 2021-06-22 NOTE — PROGRESS NOTES
Subjective:      Patient ID: Elvin Eden is a 53 y.o. male.    Chief Complaint:    53-year-old male with complaints of skin rash and itching.  The patient has a lifelong history of eczema.  Recently he was put on Victoza for diabetes.  Feels that the Victoza initiated a eczema exacerbation.  He quit the Victoza 3 weeks ago.  He is working with his primary provider to find an alternative treatment.  Meanwhile his eczema has been very bothersome.  He has rash on his face neck chest and back which is very pruritic.  He has been treating himself with 1% hydrocortisone cream over-the-counter.  He also takes Zyrtec 5 mg twice daily.  He has been taking some Benadryl as needed to help him sleep.  He would like to go see a dermatologist to discuss more efficacious treatment options.  The eczema is also causing a blepharitis and some conjunctival injection and itching.          Past Medical History:   Diagnosis Date     Diabetes mellitus, type II (H) 8/21/2015     Eczema 8/21/2015       No past surgical history on file.    Family History   Problem Relation Age of Onset     Alcohol abuse Mother      Depression Mother      Diabetes Father        Social History     Tobacco Use     Smoking status: Never Smoker     Smokeless tobacco: Never Used   Substance Use Topics     Alcohol use: No     Drug use: No       Review of Systems   Eyes: Positive for redness and itching.   Skin: Positive for rash.   All other systems reviewed and are negative.      Objective:     /80 (Patient Site: Right Arm, Patient Position: Sitting, Cuff Size: Adult Regular)   Pulse 99   Temp 98.3  F (36.8  C) (Oral)   Resp 24   Wt (!) 228 lb 9 oz (103.7 kg)   SpO2 96%   BMI 31.00 kg/m      Physical Exam   Constitutional: He is oriented to person, place, and time. He appears well-developed and well-nourished. No distress.   Eyes: Right conjunctiva is injected. Left conjunctiva is injected.   Neurological: He is alert and oriented to person,  place, and time.   Skin: Skin is warm and dry. Rash noted. He is not diaphoretic.   Erythematous maculopapular rash on the face bilaterally, posterior neck, anterior chest, and scattered on the extremities.  Rashes consistent with atopic dermatitis.   Psychiatric: He has a normal mood and affect. His behavior is normal. Judgment and thought content normal.   Nursing note and vitals reviewed.      Assessment:     Procedures    The encounter diagnosis was Eczema, unspecified type.    Plan:     1.  Your diagnosis is eczema exacerbation possibly initiated by Victoza administration  2.  I recommend making an appointment with a dermatologist in the near future  3.  I recommend hydrocortisone cream 2.5% applied 3 times a day as needed to the head and face for eczema.  4.  I recommend triamcinolone cream 0.1% applied 3 times a day from the neck down for eczema.  5.  I recommend Patanol drops 1 drop both eyes twice daily for ocular redness and itching  6.  Follow-up at the walk-in clinic as needed for worsening symptoms  7.  Keep her skin moisturized with moisturizing lotions and limitation of bathing  8.  Continue Zyrtec 1 tablet twice daily

## 2021-06-22 NOTE — TELEPHONE ENCOUNTER
While he was on the Victoza, blood sugars were very well controlled, but then developed rash so stopped. He went back on Amaryl until we could get an alternative GLP-1 agonist covered. Now that [employer] is covered, will stop Amaryl and start Trulicity.

## 2021-06-23 NOTE — TELEPHONE ENCOUNTER
Refill Approved    Rx renewed per Medication Renewal Policy. Medication was last renewed on 9/28/18.    Miriam Guzman, [facility] Triage/Med Refill 1/25/2019     Requested Prescriptions   Pending Prescriptions Disp Refills     metFORMIN (GLUCOPHAGE) 1000 MG tablet 180 tablet 0     Sig: Take 1 tablet (1,000 mg total) by mouth 2 (two) times a day with meals.    Metformin Refill Protocol Passed - 1/25/2019 12:09 PM       Passed - Blood pressure in last 12 months    BP Readings from Last 1 Encounters:   12/31/18 130/78            Passed - LFT or AST or ALT in last 12 months    Albumin   Date Value Ref Range Status   09/28/2018 3.8 3.5 - 5.0 g/dL Final     Bilirubin, Total   Date Value Ref Range Status   09/28/2018 0.7 0.0 - 1.0 mg/dL Final     Alkaline Phosphatase   Date Value Ref Range Status   09/28/2018 105 45 - 120 U/L Final     AST   Date Value Ref Range Status   09/28/2018 41 (H) 0 - 40 U/L Final     ALT   Date Value Ref Range Status   09/28/2018 63 (H) 0 - 45 U/L Final     Protein, Total   Date Value Ref Range Status   09/28/2018 6.6 6.0 - 8.0 g/dL Final               Passed - GFR or Serum Creatinine in last 6 months    GFR MDRD Non Af Amer   Date Value Ref Range Status   10/18/2018 >60 >60 mL/min/1.73m2 Final     GFR MDRD Af Amer   Date Value Ref Range Status   10/18/2018 >60 >60 mL/min/1.73m2 Final            Passed - Visit with PCP or prescribing provider visit in last 6 months or next 3 months    Last office visit with prescriber/PCP: Visit date not found OR same dept: 11/26/2018 Luis E Velázquez CNP OR same specialty: 11/26/2018 Luis E Velázquez CNP Last physical: Visit date not found Last MTM visit: Visit date not found         Next appt within 3 mo: Visit date not found  Next physical within 3 mo: Visit date not found  Prescriber OR PCP: Sobia Aviles MD  Last diagnosis associated with med order: 1. Type 2 diabetes mellitus without complication (H)  - metFORMIN (GLUCOPHAGE) 1000 MG tablet;  Take 1 tablet (1,000 mg total) by mouth 2 (two) times a day with meals.  Dispense: 180 tablet; Refill: 0     If protocol passes may refill for 12 months if within 3 months of last provider visit (or a total of 15 months).          Passed - A1C in last 6 months    Hemoglobin A1c   Date Value Ref Range Status   09/28/2018 7.0 (H) 3.5 - 6.0 % Final              Passed - Microalbumin in last year     Microalbumin, Random Urine   Date Value Ref Range Status   09/28/2018 0.69 0.00 - 1.99 mg/dL Final

## 2021-06-23 NOTE — TELEPHONE ENCOUNTER
MTM Follow Up Encounter  Assessment & Plan                                                     1. Type 2 diabetes mellitus   Tolerating change from Victoza to Trulicity. He developed hives to Victoza, but no allergic reaction noted since starting Trulicity. For improved blood sugar control, will increase dose to 1.5 mg subcu weekly.  He will be having A1c rechecked in 2 months with endocrinology.   Blood pressure is well controlled and meeting goal of <140/90 mm Hg per JNC-8 hypertension guidelines. No antihypertensives. No microalbuminuria.   Calculated 10-year ASCVD risk is less than 10%, so no need for low-dose aspirin for primary prevention at this time.  Will reevaluate in the future.  Plan   1. Increase Trulicity to 1.5 mg SQ weekly.     2. Hyperlipidemia  Appropriately on a moderate intensity statin given age and diabetes diagnosis per ACC/AHA guidelines.       Follow Up  Return in about 1 month (around 3/4/2019).      Subjective & Objective                                                     Elvin Eden is a 53 y.o. male called for a follow up visit for Medication Therapy Management. He was referred to me from Sobia Aviles MD    Chief Complaint: Follow up Trulicity   Medication Adherence/Access: Good; no issues identified.     Elvin is taking metformin 1000 mg two times a day and Trulicity 0.75 mg SQ weekly.  He stopped glimepiride as instructed. He was on Victoza, but this caused hives and eczema exacerbation when he increased the dose so we changed to Trulicity. Ozempic is not covered.  He denies any issues with rash or hives on the Trulicity.  No nausea or other side effects noted.  Reports blood sugars in the 120-135 range or higher at times. No lows.  He is requesting a refill of metformin.    He has resumed atorvastatin 10 mg daily as discussed at our last visit.     Lab Results   Component Value Date    HGBA1C 7.0 (H) 09/28/2018    HGBA1C 7.3 (H) 04/24/2018    HGBA1C 6.9 (H) 09/28/2017      Lab Results   Component Value Date    MICROALBUR 0.69 09/28/2018    LDLCALC 127 09/28/2018    CREATININE 0.89 10/18/2018     The 10-year ASCVD risk score (Elisa DOUGHERTY Jr., et al., 2013) is: 10.7%    Values used to calculate the score:      Age: 53 years      Sex: Male      Is Non- : No      Diabetic: Yes      Tobacco smoker: No      Systolic Blood Pressure: 130 mmHg      Is BP treated: No      HDL Cholesterol: 30 mg/dL      Total Cholesterol: 179 mg/dL      PMH: reviewed in EPIC   Allergies/ADRs: reviewed in EPIC   Alcohol: reviewed in EPIC   Tobacco:   Social History     Tobacco Use   Smoking Status Never Smoker   Smokeless Tobacco Never Used     Today's Vitals:   BP Readings from Last 3 Encounters:   12/31/18 130/78   12/20/18 124/80   12/07/18 128/80     Pulse Readings from Last 3 Encounters:   12/31/18 86   12/20/18 93   12/07/18 99     Wt Readings from Last 3 Encounters:   12/31/18 (!) 233 lb (105.7 kg)   12/20/18 (!) 234 lb 1.6 oz (106.2 kg)   12/07/18 (!) 228 lb 9 oz (103.7 kg)     ----------------    Much or all of the text in this note was generated through the use of Dragon Dictate voice-to-text software. Errors in spelling or words which seem out of context are unintentional. Sound alike errors, in particular, may have escaped editing.    The patient declined an after visit summary    I spent 30 minutes with this patient today.   All changes were made via collaborative practice agreement with Sobia Aviles MD. A copy of the visit note was provided to the patient's provider.     Daniela Rust, PharmD, BCACP  Medication Management Pharmacist  [facility]        Current Outpatient Medications   Medication Sig Dispense Refill     atorvastatin (LIPITOR) 10 MG tablet Take 1 tablet (10 mg total) by mouth daily. 90 tablet 3     blood glucose test strips Use 3 each As Directed daily. Dispense brand per patient's insurance at pharmacy discretion. 300 strip 1      cetirizine (ZYRTEC) 10 MG tablet Take 1 tablet (10 mg total) by mouth daily. 30 tablet 0     dulaglutide (TRULICITY) 1.5 mg/0.5 mL PnIj Inject 1.5 mg under the skin every 7 days. 4 Syringe 5     famotidine (PEPCID) 20 MG tablet Take 1 tablet (20 mg total) by mouth 2 (two) times a day. 60 tablet 0     generic lancets (FINGERSTIX LANCETS) Use 3 each As Directed daily. Dispense brand per patient's insurance at pharmacy discretion. 300 each 1     hydrocortisone 2.5 % cream Apply to head and face three times a day as needed for Eczema.. 30 g 2     metFORMIN (GLUCOPHAGE) 1000 MG tablet Take 1 tablet (1,000 mg total) by mouth 2 (two) times a day with meals. 180 tablet 3     triamcinolone (KENALOG) 0.1 % cream Apply neck down three times a day as needed for eczema. 30 g 2     No current facility-administered medications for this visit.

## 2021-06-24 NOTE — TELEPHONE ENCOUNTER
Refill Approved    Rx renewed per Medication Renewal Policy. Medication was last renewed on 12/20/18.    Chante Yoder, [facility] Triage/Med Refill 2/27/2019     Requested Prescriptions   Pending Prescriptions Disp Refills     famotidine (PEPCID) 20 MG tablet 60 tablet 0     Sig: Take 1 tablet (20 mg total) by mouth 2 (two) times a day.    GI Medications Refill Protocol Passed - 2/27/2019 11:41 AM       Passed - PCP or prescribing provider visit in last 12 or next 3 months.    Last office visit with prescriber/PCP: 9/28/2017 Sobia Aviles MD OR same dept: 11/26/2018 Luis E Velázquez CNP OR same specialty: 11/26/2018 Luis E Velázquez CNP  Last physical: Visit date not found Last MTM visit: Visit date not found   Next visit within 3 mo: Visit date not found  Next physical within 3 mo: Visit date not found  Prescriber OR PCP: Sobia Aviles MD  Last diagnosis associated with med order: 1. Urticaria  - famotidine (PEPCID) 20 MG tablet; Take 1 tablet (20 mg total) by mouth 2 (two) times a day.  Dispense: 60 tablet; Refill: 0    If protocol passes may refill for 12 months if within 3 months of last provider visit (or a total of 15 months).

## 2021-06-24 NOTE — PROGRESS NOTES
MTM Follow Up Encounter  Assessment & Plan                                                     1. Type 2 diabetes mellitus   Blood sugar improvement with dose increase in Trulicity. He is still tolerating Trulicity even with dose increase. He developed hives to Victoza, but no allergic reaction noted since starting Trulicity. He will be having A1c rechecked next month with endocrinology.   Blood pressure is well controlled and meeting goal of <140/90 mm Hg per JNC-8 hypertension guidelines. No antihypertensives. No microalbuminuria.   Calculated 10-year ASCVD risk is less than 10%, so no need for low-dose aspirin for primary prevention at this time.  Will reevaluate in the future.    2. Hyperlipidemia  Appropriately on a moderate intensity statin given age and diabetes diagnosis per ACC/AHA guidelines.       Follow Up  With Endo next month for A1c recheck.     Subjective & Objective                                                     Elvin Eden is a 53 y.o. male called for a follow up visit for Medication Therapy Management. He was referred to me from Sobia Aviles MD    Chief Complaint: Follow up Trulicity   Medication Adherence/Access: Good; no issues identified.     Elvin is taking metformin 1000 mg two times a day and Trulicity 1.5 mg SQ weekly. At our last visit, we increased dose of Trulicity. He was on Victoza, but this caused hives and eczema exacerbation when he increased the dose so we changed to Trulicity. Ozempic is not covered.  He denies any issues with rash or hives on the Trulicity with dose increase.  No nausea or other side effects noted.  Reports blood sugars in the  range. No lows.      He has resumed atorvastatin 10 mg daily as discussed at our previous visit.     Lab Results   Component Value Date    HGBA1C 7.0 (H) 09/28/2018    HGBA1C 7.3 (H) 04/24/2018    HGBA1C 6.9 (H) 09/28/2017     Lab Results   Component Value Date    MICROALBUR 0.69 09/28/2018    LDLCALC 127 09/28/2018     CREATININE 0.89 10/18/2018     The 10-year ASCVD risk score (Elisa DOUGHERTY Jr., et al., 2013) is: 10.7%    Values used to calculate the score:      Age: 53 years      Sex: Male      Is Non- : No      Diabetic: Yes      Tobacco smoker: No      Systolic Blood Pressure: 130 mmHg      Is BP treated: No      HDL Cholesterol: 30 mg/dL      Total Cholesterol: 179 mg/dL      PMH: reviewed in EPIC   Allergies/ADRs: reviewed in EPIC   Alcohol: reviewed in EPIC   Tobacco:   Social History     Tobacco Use   Smoking Status Never Smoker   Smokeless Tobacco Never Used     Today's Vitals:   BP Readings from Last 3 Encounters:   12/31/18 130/78   12/20/18 124/80   12/07/18 128/80     Pulse Readings from Last 3 Encounters:   12/31/18 86   12/20/18 93   12/07/18 99     Wt Readings from Last 3 Encounters:   12/31/18 (!) 233 lb (105.7 kg)   12/20/18 (!) 234 lb 1.6 oz (106.2 kg)   12/07/18 (!) 228 lb 9 oz (103.7 kg)     ----------------    Much or all of the text in this note was generated through the use of Dragon Dictate voice-to-text software. Errors in spelling or words which seem out of context are unintentional. Sound alike errors, in particular, may have escaped editing.    The patient declined an after visit summary    I spent 15 minutes with this patient today.   All changes were made via collaborative practice agreement with Sobia Aviles MD. A copy of the visit note was provided to the patient's provider.     Daniela Rust, PharmD, BCACP  Medication Management Pharmacist  [facility]        Current Outpatient Medications   Medication Sig Dispense Refill     atorvastatin (LIPITOR) 10 MG tablet Take 1 tablet (10 mg total) by mouth daily. 90 tablet 3     blood glucose test strips Use 3 each As Directed daily. Dispense brand per patient's insurance at pharmacy discretion. 300 strip 1     cetirizine (ZYRTEC) 10 MG tablet Take 1 tablet (10 mg total) by mouth daily. 30 tablet 0      dulaglutide (TRULICITY) 1.5 mg/0.5 mL PnIj Inject 1.5 mg under the skin every 7 days. 4 Syringe 5     famotidine (PEPCID) 20 MG tablet Take 1 tablet (20 mg total) by mouth 2 (two) times a day. 180 tablet 2     generic lancets (FINGERSTIX LANCETS) Use 3 each As Directed daily. Dispense brand per patient's insurance at pharmacy discretion. 300 each 1     hydrocortisone 2.5 % cream Apply to head and face three times a day as needed for Eczema.. 30 g 2     metFORMIN (GLUCOPHAGE) 1000 MG tablet Take 1 tablet (1,000 mg total) by mouth 2 (two) times a day with meals. 180 tablet 3     triamcinolone (KENALOG) 0.1 % cream Apply neck down three times a day as needed for eczema. 30 g 2     No current facility-administered medications for this visit.

## 2021-06-26 NOTE — PROGRESS NOTES
Progress Notes by Jose Enrique Coleman MD at 11/23/2018  1:40 PM     Author: Jose Enrique Coleman MD Service: -- Author Type: Physician    Filed: 12/2/2018  8:56 PM Encounter Date: 11/23/2018 Status: Signed    : Jose Enrique Coleman MD (Physician)       Subjective:   Elvin Eden is a(n) 53 y.o. White or  male who presents to [facility] with the following complaint(s):  Urticaria (10 days- victoza)    History of Present Illness:  Primary symptom: Rash  Onset: 2 week(s) ago  Location: Primarily face; also neck, ears, chest, and upper arms  Progression: Seemed to be improving temporarily, but now worsening  Pruritic: Yes, unable to sleep due to itching  Painful: No  Discharge: No  Bleeding: No  Fevers: No  Chills: No  Lip / tongue / face swelling: No  Throat / neck swelling: No  Difficulty speaking: No  Difficulty breathing: No  Difficulty swallowing: No  Additional symptoms: None  Home therapies utilized: Hydrocortisone 1%, Benadryl topical, Benadryl orally, moisturizer  History of similar rash: No  Contacts with similar rash: No  Known environmental exposure: No  New medication use: Victoza started 10/25/2018; stopped approximately 10 days ago due to rash  New detergent / fabric softener exposure: No  New personal hygiene product exposure: No  Pet / animal exposure: No  Tobacco use / exposure: No    The following portions of the patient's history were reviewed and updated as appropriate: allergies, current medications, past family history, past medical history, past social history, past surgical history and problem list.    Review of Systems:   Review of Systems   All other systems reviewed and are negative.    Objective:     Vitals:    11/23/18 1355   BP: 124/80   Patient Site: Right Arm   Patient Position: Sitting   Cuff Size: Adult Regular   Pulse: 95   Resp: 16   Temp: 98.2  F (36.8  C)   TempSrc: Oral   SpO2: 97%   Weight: (!) 228 lb 11.2 oz (103.7 kg)     Physical Exam   Constitutional: He is  oriented to person, place, and time. He appears well-developed and well-nourished.  Non-toxic appearance. He does not appear ill. No distress.   HENT:   Head: Normocephalic and atraumatic.   Right Ear: Tympanic membrane and ear canal normal.   Left Ear: Tympanic membrane and ear canal normal.   Nose: No mucosal edema or rhinorrhea.   Mouth/Throat: Uvula is midline, oropharynx is clear and moist and mucous membranes are normal. No oral lesions.   Eyes: EOM and lids are normal. Right conjunctiva is injected. Left conjunctiva is injected.   Bilateral proptosis.    Neck: Neck supple.   Cardiovascular: Normal rate, regular rhythm, S1 normal and S2 normal. Exam reveals no gallop and no friction rub.   No murmur heard.  Pulmonary/Chest: Effort normal and breath sounds normal. No stridor. He has no wheezes. He has no rhonchi. He has no rales.   Lymphadenopathy:     He has no cervical adenopathy.   Neurological: He is alert and oriented to person, place, and time. No cranial nerve deficit.   Skin: Skin is warm and dry. Rash noted. Rash is urticarial (on the cheeks, ears, neck, upper chest, and upper back). No pallor.   Nursing note and vitals reviewed.                Laboratory:  N/A    Radiology:  N/A    Electrocardiogram:  N/A    Assessment/Plan   1. Urticaria due to drug allergy  - predniSONE (DELTASONE) 10 mg tablet; Take 40mg x 3 days, then 30mg x 3 days, then 20mg x 3 days, then 10mg x 3 days.  Dispense: 30 tablet; Refill: 0    2. Type 2 diabetes mellitus without complication, without long-term current use of insulin (H)  - glimepiride (AMARYL) 1 mg tablet; Take 2 tablets (2 mg total) by mouth every morning.  Dispense: 60 tablet; Refill: 0    - Discontinuing Victoza. Resuming Amaryl. Treating urticaria with prednisone burst / taper as listed above due to severe pruritus. Continue diphenhydramine as needed. Scheduled follow up with primary care in 3 days due to unavailability of an Endocrinology follow up appointment.    - Counseled patient regarding assessment and plan for evaluation and treatment. Questions were answered. See AVS for the specific written instructions and educational handout(s) regarding urticaria that were provided at the conclusion of the visit.   - Discussed signs / symptoms that warrant urgent / emergent medical attention.   - Follow up with primary care in 3 days.     Jose Enrique Coleman MD

## 2021-06-27 NOTE — PROGRESS NOTES
Progress Notes by Elmira Link MD at 7/9/2019  1:40 PM     Author: Elmira Link MD Service: -- Author Type: Physician    Filed: 7/9/2019  7:56 PM Encounter Date: 7/9/2019 Status: Signed    : Elmira Link MD (Physician)       Provider wore a mask during this visit.   Subjective:   Elvin Eden is a 54 y.o. male  Roomed by: Bakari Uribe    Accompanied by Daughter    Refills needed? No    Do you have any forms that need to be filled out? No      Chief Complaint   Patient presents with   ? Cough     3 days   ? Headache   ? Fatigue     sweating   ? Sore Throat   ? Fever   Says fatigue, chills, and coughing started on 7/7. Daughter had been coughing a few days before he started. Denies any CP or shortness of breath, but coughing more with running. Admits being able to eat, drink and urinate normally. Denies any recent vomiting, belly pain, or diarrhea. Gets a little nauseated from trulicity. Admits cold sweats. Denies runny nose or nasal congestion. Has tried Nyquil, but it didn't help. Denies any family history of asthma.     Review of Systems  See HPI for ROS, otherwise balance of other systems negative    Allergies   Allergen Reactions   ? Iodine Swelling     Swelling in eyes, skin dryness.   ? Victoza [Liraglutide] Hives       Current Outpatient Medications:   ?  atorvastatin (LIPITOR) 10 MG tablet, Take 1 tablet (10 mg total) by mouth daily., Disp: 90 tablet, Rfl: 3  ?  cetirizine (ZYRTEC) 10 MG tablet, Take 1 tablet (10 mg total) by mouth daily., Disp: 30 tablet, Rfl: 0  ?  dulaglutide (TRULICITY) 1.5 mg/0.5 mL PnIj, Inject 1.5 mg under the skin every 7 days., Disp: 12 Syringe, Rfl: 5  ?  metFORMIN (GLUCOPHAGE) 1000 MG tablet, Take 1 tablet (1,000 mg total) by mouth 2 (two) times a day with meals., Disp: 180 tablet, Rfl: 3  ?  blood glucose test strips, Use 3 each As Directed daily. Dispense brand per patient's insurance at pharmacy discretion., Disp: 300 strip, Rfl: 1  ?  famotidine (PEPCID)  20 MG tablet, Take 1 tablet (20 mg total) by mouth 2 (two) times a day., Disp: 180 tablet, Rfl: 2  ?  generic lancets (FINGERSTIX LANCETS), Use 3 each As Directed daily. Dispense brand per patient's insurance at pharmacy discretion., Disp: 300 each, Rfl: 1  ?  glimepiride (AMARYL) 1 MG tablet, TAKE 2 TABLETS BY MOUTH EVERY MORNING., Disp: 60 tablet, Rfl: 0  ?  hydrocortisone 2.5 % cream, Apply to head and face three times a day as needed for Eczema.., Disp: 30 g, Rfl: 2  ?  ondansetron (ZOFRAN ODT) 4 MG disintegrating tablet, Take 1 tablet (4 mg total) by mouth every 8 (eight) hours as needed., Disp: 15 tablet, Rfl: 0  ?  triamcinolone (KENALOG) 0.1 % cream, Apply neck down three times a day as needed for eczema., Disp: 30 g, Rfl: 2  Patient Active Problem List   Diagnosis   ? Diabetes mellitus, type II (H)   ? Eczema     Past Medical History:   Diagnosis Date   ? Diabetes mellitus, type II (H) 8/21/2015   ? Eczema 8/21/2015         Objective:     Vitals:    07/09/19 1350   BP: 110/75   Patient Site: Right Arm   Patient Position: Sitting   Cuff Size: Adult Large   Pulse: (!) 106   Resp: 18   Temp: 99.9  F (37.7  C)   TempSrc: Oral   SpO2: 96%   Weight: (!) 233 lb (105.7 kg)   Gen - Pt in NAD  Eyes - Conjunctiva non injected, no drainage  Face - non TTP over frontal sinus areas; non TTP over maxillary areas  Ears - external canals - no induration, Right TM - not injected, Left TM - not injected   Nose - notn congested, no nasal drainage  Pharynx - not injected, tonsils 1+ size  Neck - supple, no cervical adenopathy, no masses  Cor - RRR w/o murmur  Lungs - Fair air entry; no wheezes or crackles noted on auscultation - sporadic coarse coughing noted  Skin - no lesions, no rashes noted     Assessment - Plan   Medical Decision Making - No clinical findings indicative of bacterial infection requiring antibiotics, such as pneumonia, sinusitis or otitis media were ascertained from today's evaluation. Presentation and  clinical findings are consistent with a viral process.  Patient admitted being able to take a deeper breath after his albuterol neb and therefore an albuterol inhaler with spacer was prescribed.  Supportive management discussed. See Patient instructions.     1. Acute viral bronchitis  - albuterol nebulizer solution 2.5 mg (PROVENTIL)  - albuterol (PROAIR HFA) 90 mcg/actuation inhaler; Inhale 1-2 puffs every 4 (four) hours as needed for wheezing or shortness of breath (cough).  Dispense: 1 Inhaler; Refill: 0  - Spacer W/O Mask    At the conclusion of the encounter, assessment and plan were discussed.   All questions were answered.   The patient or guardian acknowledged understanding and was involved in the decision making regarding the overall care plan.    Patient Instructions   1. Keep well hydrated  2. May alternate Tylenol every 6 hours with ibuprofen every 6 hours as needed for fever or pain  3. If follow up is needed, try and be seen at your primary clinic. Or you can be seen by any primary care provider at one of our other [facility] sites  4. If you have any questions, call the clinic number  - it's answered 24/7    Patient Education     Viral Bronchitis (Adult)    You have a viral bronchitis. Bronchitis is inflammation and swelling of the lining of the lungs. This is often caused by an infection. Symptoms include a dry, hacking cough that is worse at night. The cough may bring up yellow-green mucus. You may also feel short of breath or wheeze. Other symptoms may include tiredness, chest discomfort, and chills.  Bronchitis that is caused by a virus is not treated with antibiotics. Instead, medicines may be given to help relieve symptoms. Symptoms can last up to 2 weeks, although the cough may last much longer.  This illness is contagious during the first few days and is spread through the air by coughing and sneezing, or by direct contact (touching the sick person and then touching your own eyes, nose, or  mouth).  Most viral illnesses resolve within 10 to 14 days with rest and simple home remedies, although they may sometimes last for several weeks.  Home care    If symptoms are severe, rest at home for the first 2 to 3 days. When you go back to your usual activities, don't let yourself get too tired.    Do not smoke. Also avoid being exposed to secondhand smoke.    You may use over-the-counter medicine to control fever or pain, unless another pain medicine was prescribed. If you have chronic liver or kidney disease or have ever had a stomach ulcer or gastrointestinal bleeding, talk with your healthcare provider before using these medicines. Also talk to your provider if you are taking medicine to prevent blood clots. Aspirin should never be given to anyone younger than 18 years of age who is ill with a viral infection or fever. It may cause severe liver or brain damage.    Your appetite may be poor, so a light diet is fine. Avoid dehydration by drinking 6 to 8 glasses of fluids per day (such as water, soft drinks, sports drinks, juices, tea, or soup). Extra fluids will help loosen secretions in the nose and lungs.    Over-the-counter cough, cold, and sore-throat medicines will not shorten the length of the illness, but they may help to reduce symptoms. Don't use decongestants if you have high blood pressure.  Follow-up care  Follow up with your healthcare provider, or as advised. If you had an X-ray or ECG (electrocardiogram), a specialist will review it. You will be notified of any new findings that may affect your care.  If you are age 65 or older, or if you have a chronic lung disease or condition that affects your immune system, or you smoke, ask your healthcare provider about getting a pneumococcal vaccine and a yearly flu shot (influenza vaccine).  When to seek medical advice  Call your healthcare provider right away if any of these occur:    Fever of 100.4 F (38 C) or higher, or as directed by your healthcare  provider    Coughing up increased amounts of colored sputum    Weakness, drowsiness, headache, facial pain, ear pain, or a stiff neck  Call 911  Call 911 if any of these occur:    Coughing up blood    Worsening weakness, drowsiness, headache, or stiff neck    Trouble breathing, wheezing, or pain with breathing  Date Last Reviewed: 9/13/2015 2000-2017 [employer]. [address] [place] [address]. All rights reserved. This information is not intended as a substitute for professional medical care. Always follow your healthcare professional's instructions.

## 2021-06-27 NOTE — PROGRESS NOTES
Progress Notes by Sarah Wheeler CNP at 12/20/2018  2:50 PM     Author: Sarah Wheeler CNP Service: -- Author Type: Nurse Practitioner    Filed: 12/27/2018  3:55 PM Encounter Date: 12/20/2018 Status: Signed    : Sarah Wheeler CNP (Nurse Practitioner)       Chief Complaint   Patient presents with   ? Urticaria     on face and back on new medication- victoza- not on med but still has hives and itchy       ASSESSMENT & PLAN:   Diagnoses and all orders for this visit:    Urticaria  -     cetirizine (ZYRTEC) 10 MG tablet  Dispense: 30 tablet; Refill: 0  -     famotidine (PEPCID) 20 MG tablet  Dispense: 60 tablet; Refill: 0        MDM:  Unlikely drug reaction as rash is persistent despite stopping Victoza.  Given histamine 1 and 2 blockers.    Patient really has no other symptoms other than the rash and no one else in the house has hives that would be associated with bedbugs.    Has type 2 diabetes and is unsure of his current blood sugar levels.  Has recently stopped his on Victoza due to presumed drug reaction.  Recommended discussing medication changes with his physician managing his diabetes.  Given this, we will not do another steroid burst for this to avoid needless hyperglycemia.    Supportive care discussed.  See discharge instructions below for specific recommendations given.      At the end of the encounter, I discussed results, diagnosis, medications. Discussed red flags for immediate return to clinic/ER, as well as indications for follow up if no improvement. Patient and/or caregiver understood and agreed to plan. Patient was stable for discharge.    SUBJECTIVE    HPI:  Hives on face, neck, back, trunk for the last month.  Just ended a steroid taper of 10 days and rash returned.      Blames this on Victoza, but hasn't taken for the last month.  Started 10 days after initiating and dose increased.      Has eczema as well.  Scratching hives makes eczema worse.    Taking Benadryl 1 in the am, 1  dinnertime, 2-3 at night.      Denies concern for bedbugs - no new furniture, no one else in house has rash.     Thinks hives are fixed/unchanging.      Has allergy appointment on Jan 8th.      Hasn't been checking blood sugars.              History obtained from the patient.    Past Medical History:   Diagnosis Date   ? Diabetes mellitus, type II (H) 8/21/2015   ? Eczema 8/21/2015       Problem List:  2015-08: Diabetes mellitus, type II (H)  2015-08: Eczema      Social History     Tobacco Use   ? Smoking status: Never Smoker   ? Smokeless tobacco: Never Used   Substance Use Topics   ? Alcohol use: No       Review of Systems   Constitutional: Negative for chills and fever.   HENT: Negative for sore throat.    Musculoskeletal: Negative for arthralgias.       OBJECTIVE    Vitals:    12/20/18 1451   BP: 124/80   Patient Site: Right Arm   Patient Position: Sitting   Cuff Size: Adult Regular   Pulse: 93   Resp: 16   Temp: 98.2  F (36.8  C)   SpO2: 94%   Weight: (!) 234 lb 1.6 oz (106.2 kg)       Physical Exam   Constitutional: He is oriented to person, place, and time. He appears well-developed and well-nourished. No distress.   HENT:   Right Ear: External ear normal.   Left Ear: External ear normal.   Eyes: Conjunctivae are normal. Right eye exhibits no discharge. Left eye exhibits no discharge.   Cardiovascular: Intact distal pulses.   Pulmonary/Chest: Effort normal.   Musculoskeletal: Normal range of motion.   Neurological: He is alert and oriented to person, place, and time.   Skin: Skin is warm and dry. Capillary refill takes less than 2 seconds.   Raised, erythematous papules consistent with urticaria located neck, chest, back.   Psychiatric: He has a normal mood and affect. His behavior is normal. Judgment and thought content normal.       Labs:  No results found for this or any previous visit (from the past 240 hour(s)).      Radiology:    No results found.    PATIENT INSTRUCTIONS:   Patient Instructions   Zyrtec  in the morning, pepcid twice a day.    Benadryl 50 mg okay before bed as well.      Patient Education     Hives (Adult)  Hives are pink or red bumps on the skin. These bumps are also known as wheals. The bumps can itch, burn, or sting. Hives can occur anywhere on the body. They vary in size and shape and can form in clusters. Individual hives can appear and go away quickly. New hives may develop as old ones fade. Hives are common and usually harmless. Occasionally hives are a sign of a serious allergy.  Hives are often caused by an allergic reaction. It may be an allergic reaction to foods such as fruit, shellfish, chocolate, nuts, or tomatoes. It may be a reaction to pollens, animal fur, or mold spores. Medicines, chemicals, and insect bites can also cause hives. And hives can be caused by hot sun or cold air. The cause of hives can be difficult to find.  You may be given medicines to relieve swelling and itching. Follow all instructions when using these medicines. The hives will usually fade in a few days, but can last up to 2 weeks.  Home care  Follow these tips:    Try to find the cause of the hives and eliminate it. Discuss possible causes with your healthcare provider. Future reactions to the same allergen may be worse.    Dont scratch the hives. Scratching will delay healing. To reduce itching, apply cool, wet compresses to the skin.    Dress in soft, loose cotton clothing.    Dont bathe in hot water. This can make the itching worse.    Apply an ice pack or cool pack wrapped in a thin towel to your skin. This will help reduce redness and itching. But if your hives were caused by exposure to cold, then do not apply more cold to them.    You may use over-the counter antihistamines to reduce itching. Some older antihistamines, such as diphenhydramine and chlorpheniramine, are inexpensive. But they need to be taken often and may make you sleepy. They are best used at bedtime. Dont use diphenhydramine if you have  glaucoma or have trouble urinating because of an enlarged prostate. Newer antihistamines, such as loratadine, cetirizine, and fexofenadine, are generally more expensive. But they tend to have fewer side effects, such as drowsiness. They can be taken less often.    Another type of antihistamine is used to treat heartburn. This type includes ranitidine, nizatidine, famotidine, and cimetidine. These are sometimes used along with the above antihistamines if a single medicine is not working.  Follow-up care  Follow up with your healthcare provider if your symptoms don't get better in 2 days. Ask your provider about allergy testing if you have had a severe reaction, or have had several episodes of hives. He or she can use the allergy testing to find out what you are allergic to.  When to seek medical advice  Call your healthcare provider right away if any of these occur:    Fever of 100.4 F (38.0 C) or higher, or as directed by your healthcare provider    Redness, swelling, or pain    Foul-smelling fluid coming from the rash  Call 911  Call 911 if any of the following occur:    Swelling of the face, throat, or tongue    Trouble breathing or swallowing    Dizziness, weakness, or fainting  Date Last Reviewed: 9/1/2016 2000-2017 [employer]. [address] [place] [address], [place] [address]. All rights reserved. This information is not intended as a substitute for professional medical care. Always follow your healthcare professional's instructions.

## 2021-07-03 NOTE — ADDENDUM NOTE
Addendum Note by Bloch, Lisa M, CMA at 9/28/2017 11:41 AM     Author: Bloch, Lisa M, CMA Service: -- Author Type: Certified Medical Assistant    Filed: 9/28/2017 11:41 AM Encounter Date: 9/28/2017 Status: Signed    : Bloch, Lisa M, CMA (Certified Medical Assistant)    Addended by: BLOCH, LISA M on: 9/28/2017 11:41 AM        Modules accepted: Orders

## 2021-12-17 ENCOUNTER — LAB REQUISITION (OUTPATIENT)
Dept: LAB | Facility: CLINIC | Age: 56
End: 2021-12-17
Payer: COMMERCIAL

## 2021-12-17 DIAGNOSIS — Z13.220 ENCOUNTER FOR SCREENING FOR LIPOID DISORDERS: ICD-10-CM

## 2021-12-17 DIAGNOSIS — E11.41 TYPE 2 DIABETES MELLITUS WITH DIABETIC MONONEUROPATHY (H): ICD-10-CM

## 2021-12-17 DIAGNOSIS — Z12.5 ENCOUNTER FOR SCREENING FOR MALIGNANT NEOPLASM OF PROSTATE: ICD-10-CM

## 2021-12-17 LAB
ALBUMIN SERPL-MCNC: 4.1 G/DL (ref 3.5–5)
ALP SERPL-CCNC: 152 U/L (ref 45–120)
ALT SERPL W P-5'-P-CCNC: 72 U/L (ref 0–45)
ANION GAP SERPL CALCULATED.3IONS-SCNC: 12 MMOL/L (ref 5–18)
AST SERPL W P-5'-P-CCNC: 24 U/L (ref 0–40)
BILIRUB SERPL-MCNC: 0.5 MG/DL (ref 0–1)
BUN SERPL-MCNC: 13 MG/DL (ref 8–22)
CALCIUM SERPL-MCNC: 10.1 MG/DL (ref 8.5–10.5)
CHLORIDE BLD-SCNC: 101 MMOL/L (ref 98–107)
CHOLEST SERPL-MCNC: 158 MG/DL
CO2 SERPL-SCNC: 27 MMOL/L (ref 22–31)
CREAT SERPL-MCNC: 0.89 MG/DL (ref 0.7–1.3)
GFR SERPL CREATININE-BSD FRML MDRD: >90 ML/MIN/1.73M2
GLUCOSE BLD-MCNC: 247 MG/DL (ref 70–125)
HDLC SERPL-MCNC: 25 MG/DL
LDLC SERPL CALC-MCNC: 81 MG/DL
POTASSIUM BLD-SCNC: 4.3 MMOL/L (ref 3.5–5)
PROT SERPL-MCNC: 7.2 G/DL (ref 6–8)
PSA SERPL-MCNC: 1.33 UG/L (ref 0–3.5)
SODIUM SERPL-SCNC: 140 MMOL/L (ref 136–145)
TRIGL SERPL-MCNC: 259 MG/DL

## 2021-12-17 PROCEDURE — 80053 COMPREHEN METABOLIC PANEL: CPT | Mod: ORL | Performed by: FAMILY MEDICINE

## 2021-12-17 PROCEDURE — G0103 PSA SCREENING: HCPCS | Performed by: FAMILY MEDICINE

## 2021-12-17 PROCEDURE — 80061 LIPID PANEL: CPT | Mod: ORL | Performed by: FAMILY MEDICINE

## 2022-03-11 ENCOUNTER — LAB REQUISITION (OUTPATIENT)
Dept: LAB | Facility: CLINIC | Age: 57
End: 2022-03-11
Payer: COMMERCIAL

## 2022-03-11 DIAGNOSIS — E11.41 TYPE 2 DIABETES MELLITUS WITH DIABETIC MONONEUROPATHY (H): ICD-10-CM

## 2022-03-11 DIAGNOSIS — R74.01 ELEVATION OF LEVELS OF LIVER TRANSAMINASE LEVELS: ICD-10-CM

## 2022-03-11 LAB
ALBUMIN SERPL-MCNC: 4.2 G/DL (ref 3.5–5)
ALP SERPL-CCNC: 116 U/L (ref 45–120)
ALT SERPL W P-5'-P-CCNC: 37 U/L (ref 0–45)
ANION GAP SERPL CALCULATED.3IONS-SCNC: 14 MMOL/L (ref 5–18)
AST SERPL W P-5'-P-CCNC: 25 U/L (ref 0–40)
BILIRUB SERPL-MCNC: 0.5 MG/DL (ref 0–1)
BUN SERPL-MCNC: 16 MG/DL (ref 8–22)
CALCIUM SERPL-MCNC: 9.8 MG/DL (ref 8.5–10.5)
CHLORIDE BLD-SCNC: 105 MMOL/L (ref 98–107)
CHOLEST SERPL-MCNC: 203 MG/DL
CO2 SERPL-SCNC: 20 MMOL/L (ref 22–31)
CREAT SERPL-MCNC: 0.84 MG/DL (ref 0.7–1.3)
GFR SERPL CREATININE-BSD FRML MDRD: >90 ML/MIN/1.73M2
GLUCOSE BLD-MCNC: 127 MG/DL (ref 70–125)
HDLC SERPL-MCNC: 35 MG/DL
LDLC SERPL CALC-MCNC: 136 MG/DL
POTASSIUM BLD-SCNC: 4.8 MMOL/L (ref 3.5–5)
PROT SERPL-MCNC: 7.7 G/DL (ref 6–8)
SODIUM SERPL-SCNC: 139 MMOL/L (ref 136–145)
TRIGL SERPL-MCNC: 162 MG/DL

## 2022-03-11 PROCEDURE — 80053 COMPREHEN METABOLIC PANEL: CPT | Mod: ORL | Performed by: FAMILY MEDICINE

## 2022-03-11 PROCEDURE — 80061 LIPID PANEL: CPT | Mod: ORL | Performed by: FAMILY MEDICINE

## 2022-12-08 ENCOUNTER — LAB REQUISITION (OUTPATIENT)
Dept: LAB | Facility: CLINIC | Age: 57
End: 2022-12-08
Payer: COMMERCIAL

## 2022-12-08 DIAGNOSIS — E78.00 PURE HYPERCHOLESTEROLEMIA, UNSPECIFIED: ICD-10-CM

## 2022-12-08 DIAGNOSIS — E11.41 TYPE 2 DIABETES MELLITUS WITH DIABETIC MONONEUROPATHY (H): ICD-10-CM

## 2022-12-08 LAB
ALBUMIN SERPL BCG-MCNC: 4.4 G/DL (ref 3.5–5.2)
ALP SERPL-CCNC: 131 U/L (ref 40–129)
ALT SERPL W P-5'-P-CCNC: 45 U/L (ref 10–50)
ANION GAP SERPL CALCULATED.3IONS-SCNC: 14 MMOL/L (ref 7–15)
AST SERPL W P-5'-P-CCNC: 18 U/L (ref 10–50)
BILIRUB SERPL-MCNC: 0.5 MG/DL
BUN SERPL-MCNC: 20.4 MG/DL (ref 6–20)
CALCIUM SERPL-MCNC: 10 MG/DL (ref 8.6–10)
CHLORIDE SERPL-SCNC: 98 MMOL/L (ref 98–107)
CHOLEST SERPL-MCNC: 206 MG/DL
CREAT SERPL-MCNC: 0.9 MG/DL (ref 0.67–1.17)
DEPRECATED HCO3 PLAS-SCNC: 25 MMOL/L (ref 22–29)
GFR SERPL CREATININE-BSD FRML MDRD: >90 ML/MIN/1.73M2
GLUCOSE SERPL-MCNC: 261 MG/DL (ref 70–99)
HDLC SERPL-MCNC: 28 MG/DL
LDLC SERPL CALC-MCNC: 124 MG/DL
NONHDLC SERPL-MCNC: 178 MG/DL
POTASSIUM SERPL-SCNC: 4.8 MMOL/L (ref 3.4–5.3)
PROT SERPL-MCNC: 6.9 G/DL (ref 6.4–8.3)
SODIUM SERPL-SCNC: 137 MMOL/L (ref 136–145)
TRIGL SERPL-MCNC: 271 MG/DL

## 2022-12-08 PROCEDURE — 80061 LIPID PANEL: CPT | Mod: ORL | Performed by: FAMILY MEDICINE

## 2022-12-08 PROCEDURE — 80053 COMPREHEN METABOLIC PANEL: CPT | Mod: ORL | Performed by: FAMILY MEDICINE

## 2023-05-08 ENCOUNTER — LAB REQUISITION (OUTPATIENT)
Dept: LAB | Facility: CLINIC | Age: 58
End: 2023-05-08
Payer: COMMERCIAL

## 2023-05-08 DIAGNOSIS — E11.41 TYPE 2 DIABETES MELLITUS WITH DIABETIC MONONEUROPATHY (H): ICD-10-CM

## 2023-05-08 DIAGNOSIS — E78.00 PURE HYPERCHOLESTEROLEMIA, UNSPECIFIED: ICD-10-CM

## 2023-05-08 LAB
ANION GAP SERPL CALCULATED.3IONS-SCNC: 12 MMOL/L (ref 7–15)
BUN SERPL-MCNC: 17.2 MG/DL (ref 6–20)
CALCIUM SERPL-MCNC: 9.7 MG/DL (ref 8.6–10)
CHLORIDE SERPL-SCNC: 102 MMOL/L (ref 98–107)
CHOLEST SERPL-MCNC: 108 MG/DL
CREAT SERPL-MCNC: 0.96 MG/DL (ref 0.67–1.17)
DEPRECATED HCO3 PLAS-SCNC: 26 MMOL/L (ref 22–29)
GFR SERPL CREATININE-BSD FRML MDRD: >90 ML/MIN/1.73M2
GLUCOSE SERPL-MCNC: 111 MG/DL (ref 70–99)
HDLC SERPL-MCNC: 29 MG/DL
LDLC SERPL CALC-MCNC: 60 MG/DL
NONHDLC SERPL-MCNC: 79 MG/DL
POTASSIUM SERPL-SCNC: 4.1 MMOL/L (ref 3.4–5.3)
SODIUM SERPL-SCNC: 140 MMOL/L (ref 136–145)
TRIGL SERPL-MCNC: 94 MG/DL

## 2023-05-08 PROCEDURE — 80061 LIPID PANEL: CPT | Mod: ORL | Performed by: FAMILY MEDICINE

## 2023-05-08 PROCEDURE — 80048 BASIC METABOLIC PNL TOTAL CA: CPT | Mod: ORL | Performed by: FAMILY MEDICINE

## 2023-08-15 NOTE — PROGRESS NOTES
MTM Initial Encounter  Assessment & Plan                                                     1. Type 2 diabetes mellitus   Ozempic not covered, so will start PA. Patient is aware to stop glimepiride when starting Ozempic. Blood sugars and weight responded well to Victoza, but unfortunately he developed allergic reaction of hives. He is open to trial of alternative GLP-1 agonist. We opted for weekly agent, Ozempic and continuing on low dose based on his response.   Blood pressure is well controlled and meeting goal of <140/90 mm Hg per JNC-8 hypertension guidelines. No antihypertensives. No microalbuminuria.   Calculated 10-year ASCVD risk is less than 10%, so no need for low-dose aspirin for primary prevention at this time.  Will reevaluate in the future.  Plan   1. Submit PA for Ozempic.   2. If covered, start Ozempic 0.25 mg injected weekly. Stop glimepiride at that time.     2. Hyperlipidemia  Appropriately on a moderate intensity statin given age and diabetes diagnosis per ACC/AHA guidelines.       Follow Up  Return in about 6 weeks (around 2/11/2019).      Subjective & Objective                                                     Elvin Eden is a 53 y.o. male coming in for an initial visit for Medication Therapy Management. He was referred to me from Sobia Aviles MD    Chief Complaint: Ozempic not covered   Medication Adherence/Access: Good; no issues identified.     Elvin is taking metformin 1000 mg two times a day and glimepiride 4 mg every morning. He hasn't started Ozempic yet because it wasn't covered. He was on Victoza, but this caused hives and eczema exacerbation when he increased the dose so we discussed changing to Ozempic.  He liked Victoza as he lost 12 pounds and BG were as low as 80-90 on Victoza. He reports BG are still higher than he'd like, in the 180 range in the morning and evening. He did complete a course of prednisone a few weeks ago.     He saw [facility] for his eczema and  with the new cream they prescribed his symptoms have greatly improved.     He has resumed atorvastatin 10 mg daily as discussed at our last visit. He hadn't been refilling it and forgot to take it.     Lab Results   Component Value Date    HGBA1C 7.0 (H) 09/28/2018    HGBA1C 7.3 (H) 04/24/2018    HGBA1C 6.9 (H) 09/28/2017     Lab Results   Component Value Date    MICROALBUR 0.69 09/28/2018    LDLCALC 127 09/28/2018    CREATININE 0.89 10/18/2018     The 10-year ASCVD risk score (Elisa DOUGHERTY JrVictorina, et al., 2013) is: 9.9%    Values used to calculate the score:      Age: 53 years      Sex: Male      Is Non- : No      Diabetic: Yes      Tobacco smoker: No      Systolic Blood Pressure: 124 mmHg      Is BP treated: No      HDL Cholesterol: 30 mg/dL      Total Cholesterol: 179 mg/dL      PMH: reviewed in EPIC   Allergies/ADRs: reviewed in EPIC   Alcohol: reviewed in EPIC   Tobacco:   Social History     Tobacco Use   Smoking Status Never Smoker   Smokeless Tobacco Never Used     Today's Vitals:   BP Readings from Last 3 Encounters:   12/31/18 130/78   12/20/18 124/80   12/07/18 128/80     Pulse Readings from Last 3 Encounters:   12/31/18 86   12/20/18 93   12/07/18 99     Wt Readings from Last 3 Encounters:   12/31/18 (!) 233 lb (105.7 kg)   12/20/18 (!) 234 lb 1.6 oz (106.2 kg)   12/07/18 (!) 228 lb 9 oz (103.7 kg)     ----------------    Much or all of the text in this note was generated through the use of Dragon Dictate voice-to-text software. Errors in spelling or words which seem out of context are unintentional. Sound alike errors, in particular, may have escaped editing.    The patient was given a summary of these recommendations as an after visit summary    I spent 30 minutes with this patient today.   All changes were made via collaborative practice agreement with Sobia Aviles MD. A copy of the visit note was provided to the patient's provider.     Daniela Rust, PharmD, BCACP  Medication  Management Pharmacist  [facility]        Current Outpatient Medications   Medication Sig Dispense Refill     atorvastatin (LIPITOR) 10 MG tablet Take 1 tablet (10 mg total) by mouth daily. 90 tablet 3     blood glucose test strips Use 3 each As Directed daily. Dispense brand per patient's insurance at pharmacy discretion. 300 strip 1     cetirizine (ZYRTEC) 10 MG tablet Take 1 tablet (10 mg total) by mouth daily. 30 tablet 0     famotidine (PEPCID) 20 MG tablet Take 1 tablet (20 mg total) by mouth 2 (two) times a day. 60 tablet 0     generic lancets (FINGERSTIX LANCETS) Use 3 each As Directed daily. Dispense brand per patient's insurance at pharmacy discretion. 300 each 1     glimepiride (AMARYL) 2 MG tablet Take 4 mg by mouth every morning.       hydrocortisone 2.5 % cream Apply to head and face three times a day as needed for Eczema.. 30 g 2     metFORMIN (GLUCOPHAGE) 1000 MG tablet Take 1 tablet (1,000 mg total) by mouth 2 (two) times a day with meals. 60 tablet 0     olopatadine (PATANOL) 0.1 % ophthalmic solution Administer 1 drop to both eyes 2 (two) times a day. 5 mL 2     semaglutide (OZEMPIC) 0.25 mg or 0.5 mg(2 mg/1.5 mL) PnIj Inject 0.25 mg under the skin every 7 days. 1 Syringe 1     triamcinolone (KENALOG) 0.1 % cream Apply neck down three times a day as needed for eczema. 30 g 2     No current facility-administered medications for this visit.             Headache/ Sore throat

## 2023-11-26 ENCOUNTER — HOSPITAL ENCOUNTER (EMERGENCY)
Facility: CLINIC | Age: 58
Discharge: HOME | End: 2023-11-26
Attending: EMERGENCY MEDICINE | Admitting: EMERGENCY MEDICINE
Payer: COMMERCIAL

## 2023-11-26 ENCOUNTER — APPOINTMENT (OUTPATIENT)
Dept: RADIOLOGY | Facility: CLINIC | Age: 58
End: 2023-11-26
Attending: EMERGENCY MEDICINE
Payer: COMMERCIAL

## 2023-11-26 VITALS
HEART RATE: 96 BPM | DIASTOLIC BLOOD PRESSURE: 87 MMHG | TEMPERATURE: 97.2 F | BODY MASS INDEX: 31.39 KG/M2 | RESPIRATION RATE: 18 BRPM | SYSTOLIC BLOOD PRESSURE: 132 MMHG | WEIGHT: 231.48 LBS | OXYGEN SATURATION: 91 %

## 2023-11-26 DIAGNOSIS — R73.9 HYPERGLYCEMIA: ICD-10-CM

## 2023-11-26 DIAGNOSIS — J98.8 WHEEZING-ASSOCIATED RESPIRATORY INFECTION (WARI): ICD-10-CM

## 2023-11-26 DIAGNOSIS — J20.9 ACUTE BRONCHITIS, UNSPECIFIED ORGANISM: ICD-10-CM

## 2023-11-26 LAB
ALBUMIN SERPL BCG-MCNC: 4.5 G/DL (ref 3.5–5.2)
ALP SERPL-CCNC: 138 U/L (ref 40–150)
ALT SERPL W P-5'-P-CCNC: 64 U/L (ref 0–70)
ANION GAP SERPL CALCULATED.3IONS-SCNC: 9 MMOL/L (ref 7–15)
AST SERPL W P-5'-P-CCNC: 44 U/L (ref 0–45)
ATRIAL RATE - MUSE: 99 BPM
BASOPHILS # BLD AUTO: 0.1 10E3/UL (ref 0–0.2)
BASOPHILS NFR BLD AUTO: 1 %
BILIRUB SERPL-MCNC: 0.4 MG/DL
BUN SERPL-MCNC: 23.7 MG/DL (ref 6–20)
CALCIUM SERPL-MCNC: 10.2 MG/DL (ref 8.6–10)
CHLORIDE SERPL-SCNC: 103 MMOL/L (ref 98–107)
CREAT SERPL-MCNC: 1.03 MG/DL (ref 0.67–1.17)
D DIMER PPP FEU-MCNC: 0.33 UG/ML FEU (ref 0–0.5)
DEPRECATED HCO3 PLAS-SCNC: 27 MMOL/L (ref 22–29)
DIASTOLIC BLOOD PRESSURE - MUSE: 85 MMHG
EGFRCR SERPLBLD CKD-EPI 2021: 85 ML/MIN/1.73M2
EOSINOPHIL # BLD AUTO: 0.2 10E3/UL (ref 0–0.7)
EOSINOPHIL NFR BLD AUTO: 2 %
ERYTHROCYTE [DISTWIDTH] IN BLOOD BY AUTOMATED COUNT: 11.9 % (ref 10–15)
FLUAV RNA SPEC QL NAA+PROBE: NEGATIVE
FLUBV RNA RESP QL NAA+PROBE: NEGATIVE
GLUCOSE SERPL-MCNC: 191 MG/DL (ref 70–99)
HCT VFR BLD AUTO: 48.5 % (ref 40–53)
HGB BLD-MCNC: 16.8 G/DL (ref 13.3–17.7)
IMM GRANULOCYTES # BLD: 0 10E3/UL
IMM GRANULOCYTES NFR BLD: 0 %
INR PPP: 0.87 (ref 0.85–1.15)
INTERPRETATION ECG - MUSE: NORMAL
LYMPHOCYTES # BLD AUTO: 2.2 10E3/UL (ref 0.8–5.3)
LYMPHOCYTES NFR BLD AUTO: 20 %
MCH RBC QN AUTO: 32.8 PG (ref 26.5–33)
MCHC RBC AUTO-ENTMCNC: 34.6 G/DL (ref 31.5–36.5)
MCV RBC AUTO: 95 FL (ref 78–100)
MONOCYTES # BLD AUTO: 1 10E3/UL (ref 0–1.3)
MONOCYTES NFR BLD AUTO: 9 %
NEUTROPHILS # BLD AUTO: 7.7 10E3/UL (ref 1.6–8.3)
NEUTROPHILS NFR BLD AUTO: 68 %
NRBC # BLD AUTO: 0 10E3/UL
NRBC BLD AUTO-RTO: 0 /100
NT-PROBNP SERPL-MCNC: <36 PG/ML (ref 0–900)
P AXIS - MUSE: 44 DEGREES
PLATELET # BLD AUTO: 317 10E3/UL (ref 150–450)
POTASSIUM SERPL-SCNC: 4.2 MMOL/L (ref 3.4–5.3)
PR INTERVAL - MUSE: 166 MS
PROT SERPL-MCNC: 7.3 G/DL (ref 6.4–8.3)
QRS DURATION - MUSE: 92 MS
QT - MUSE: 334 MS
QTC - MUSE: 428 MS
R AXIS - MUSE: 50 DEGREES
RBC # BLD AUTO: 5.12 10E6/UL (ref 4.4–5.9)
RSV RNA SPEC NAA+PROBE: NEGATIVE
SARS-COV-2 RNA RESP QL NAA+PROBE: NEGATIVE
SODIUM SERPL-SCNC: 139 MMOL/L (ref 135–145)
SYSTOLIC BLOOD PRESSURE - MUSE: 144 MMHG
T AXIS - MUSE: 37 DEGREES
TROPONIN T SERPL HS-MCNC: 6 NG/L
TROPONIN T SERPL HS-MCNC: 8 NG/L
VENTRICULAR RATE- MUSE: 99 BPM
WBC # BLD AUTO: 11.2 10E3/UL (ref 4–11)

## 2023-11-26 PROCEDURE — 94640 AIRWAY INHALATION TREATMENT: CPT

## 2023-11-26 PROCEDURE — 85379 FIBRIN DEGRADATION QUANT: CPT | Performed by: EMERGENCY MEDICINE

## 2023-11-26 PROCEDURE — 85610 PROTHROMBIN TIME: CPT | Performed by: EMERGENCY MEDICINE

## 2023-11-26 PROCEDURE — 36415 COLL VENOUS BLD VENIPUNCTURE: CPT | Performed by: EMERGENCY MEDICINE

## 2023-11-26 PROCEDURE — 99285 EMERGENCY DEPT VISIT HI MDM: CPT | Mod: 25

## 2023-11-26 PROCEDURE — 250N000011 HC RX IP 250 OP 636: Mod: JZ | Performed by: EMERGENCY MEDICINE

## 2023-11-26 PROCEDURE — 80053 COMPREHEN METABOLIC PANEL: CPT | Performed by: EMERGENCY MEDICINE

## 2023-11-26 PROCEDURE — 85025 COMPLETE CBC W/AUTO DIFF WBC: CPT | Performed by: EMERGENCY MEDICINE

## 2023-11-26 PROCEDURE — 93005 ELECTROCARDIOGRAM TRACING: CPT | Performed by: EMERGENCY MEDICINE

## 2023-11-26 PROCEDURE — 87637 SARSCOV2&INF A&B&RSV AMP PRB: CPT | Performed by: EMERGENCY MEDICINE

## 2023-11-26 PROCEDURE — 96374 THER/PROPH/DIAG INJ IV PUSH: CPT

## 2023-11-26 PROCEDURE — 84484 ASSAY OF TROPONIN QUANT: CPT | Performed by: EMERGENCY MEDICINE

## 2023-11-26 PROCEDURE — 71046 X-RAY EXAM CHEST 2 VIEWS: CPT

## 2023-11-26 PROCEDURE — 83880 ASSAY OF NATRIURETIC PEPTIDE: CPT | Performed by: EMERGENCY MEDICINE

## 2023-11-26 PROCEDURE — 999N000157 HC STATISTIC RCP TIME EA 10 MIN

## 2023-11-26 PROCEDURE — 250N000009 HC RX 250: Performed by: EMERGENCY MEDICINE

## 2023-11-26 RX ORDER — PREDNISONE 20 MG/1
TABLET ORAL
Qty: 10 TABLET | Refills: 0 | Status: SHIPPED | OUTPATIENT
Start: 2023-11-26

## 2023-11-26 RX ORDER — IPRATROPIUM BROMIDE AND ALBUTEROL SULFATE 2.5; .5 MG/3ML; MG/3ML
3 SOLUTION RESPIRATORY (INHALATION) ONCE
Status: COMPLETED | OUTPATIENT
Start: 2023-11-26 | End: 2023-11-26

## 2023-11-26 RX ORDER — DOXYCYCLINE 100 MG/1
100 CAPSULE ORAL 2 TIMES DAILY
Qty: 14 CAPSULE | Refills: 0 | Status: SHIPPED | OUTPATIENT
Start: 2023-11-26 | End: 2023-12-03

## 2023-11-26 RX ORDER — ALBUTEROL SULFATE 90 UG/1
2 AEROSOL, METERED RESPIRATORY (INHALATION) EVERY 6 HOURS PRN
Qty: 18 G | Refills: 0 | Status: SHIPPED | OUTPATIENT
Start: 2023-11-26

## 2023-11-26 RX ORDER — METHYLPREDNISOLONE SODIUM SUCCINATE 125 MG/2ML
125 INJECTION, POWDER, LYOPHILIZED, FOR SOLUTION INTRAMUSCULAR; INTRAVENOUS ONCE
Status: COMPLETED | OUTPATIENT
Start: 2023-11-26 | End: 2023-11-26

## 2023-11-26 RX ADMIN — METHYLPREDNISOLONE SODIUM SUCCINATE 125 MG: 125 INJECTION, POWDER, FOR SOLUTION INTRAMUSCULAR; INTRAVENOUS at 17:36

## 2023-11-26 RX ADMIN — IPRATROPIUM BROMIDE AND ALBUTEROL SULFATE 3 ML: .5; 3 SOLUTION RESPIRATORY (INHALATION) at 17:40

## 2023-11-26 ASSESSMENT — ENCOUNTER SYMPTOMS
DIARRHEA: 0
NAUSEA: 0
COUGH: 1
CHEST TIGHTNESS: 1
HEADACHES: 1
SHORTNESS OF BREATH: 1
CONSTIPATION: 0
VOMITING: 0

## 2023-11-26 ASSESSMENT — ACTIVITIES OF DAILY LIVING (ADL): ADLS_ACUITY_SCORE: 35

## 2023-11-26 NOTE — ED TRIAGE NOTES
"Pt presents to the ED with c/o SOB with exertion that has been going on for about two days. Pt denies CP, but endorses an \"anxiety feeling in chest\". Reports having a cough that has been going on for a month. Endorses some nausea, no vomiting.      Triage Assessment (Adult)       Row Name 11/26/23 3019          Triage Assessment    Airway WDL WDL        Respiratory WDL    Respiratory WDL X;rhythm/pattern     Rhythm/Pattern, Respiratory shortness of breath        Skin Circulation/Temperature WDL    Skin Circulation/Temperature WDL WDL        Cardiac WDL    Cardiac WDL WDL        Peripheral/Neurovascular WDL    Peripheral Neurovascular WDL WDL        Cognitive/Neuro/Behavioral WDL    Cognitive/Neuro/Behavioral WDL WDL                     "

## 2023-11-26 NOTE — ED PROVIDER NOTES
EMERGENCY DEPARTMENT ENCOUNTER      NAME: Elvin Eden  AGE: 58 year old male  YOB: 1965  MRN: [id number]  EVALUATION DATE & TIME: 11/26/2023  5:05 PM    PCP: Sobia Aviles    ED PROVIDER: Zi Bull M.D.     Chief Complaint   Patient presents with    Shortness of Breath     FINAL IMPRESSION:  1. Acute bronchitis, unspecified organism    2. Wheezing-associated respiratory infection (WARI)    3. Hyperglycemia          ED COURSE & MEDICAL DECISION MAKING:    Pertinent Labs & Imaging studies reviewed. (See chart for details)  58 year old male presents to the Emergency Department for evaluation of shortness of breath symptoms.  Patient with cough and upper respiratory type symptoms for multiple weeks duration.  Presents emergency department increasing shortness of breath.  For the last couple of days she has had an associated chest tightness.  ECG at arrival nonischemic.  Patient overall healthy.  He reports having this happen 1 time in the past but it resolved with time.  Initial troponin was negative.  BNP negative.  Troponin negative.  Viral studies negative.  Chest x-ray without acute pneumonia.  Patient improved considerably after nebulization treatment.  He was noted to have borderline hypoxia with O2 saturations hovering in the lower 90s.  I think this is secondary to bronchospasm and acute bronchitis based on his overall clinical history examination workup.  He was administered a dose steroids in the emergency department.  Second troponin performed which is negative.  I think patient overall given his negative cardiac workup is appropriate for discharge home with plan for symptomatic management.  He will be discharged on short course of steroids.  Nebulization medication.  I am going to also prescribe antibiotics given the length of time of his symptoms.  I stressed the patient that we will need to monitor his oxygen on outpatient basis and have close follow-up to his primary care  doctor.  Patient will  a pulse oximeter to monitor his O2 saturation at home if he has escalation to his symptoms or develop additional concern he is to return to the emergency department for repeat assessment.  Patient comfortable with this plan of care.       5:17 PM I met with the patient, obtained history, performed an initial exam, and discussed options and plan for diagnostics and treatment here in the ED.    At the conclusion of the encounter I discussed the results of all of the tests and the disposition. The questions were answered. The patient or family acknowledged understanding and was agreeable with the care plan.       Medical Decision Making    History:  Supplemental history from: Documented in chart, if applicable  External Record(s) reviewed: Documented in chart, if applicable.    Work Up:  Chart documentation includes differential considered and any EKGs or imaging independently interpreted by provider, where specified.  In additional to work up documented, I considered the following work up: Documented in chart, if applicable.    External consultation:  Discussion of management with another provider: Documented in chart, if applicable    Complicating factors:  Care impacted by chronic illness: Diabetes  Care affected by social determinants of health: N/A    Disposition considerations: Discharge. I prescribed additional prescription strength medication(s) as charted. I considered admission, but discharged patient after significant clinical improvement.        MEDICATIONS GIVEN IN THE EMERGENCY:  Medications   ipratropium - albuterol 0.5 mg/2.5 mg/3 mL (DUONEB) neb solution 3 mL (3 mLs Nebulization $Given 11/26/23 1740)   methylPREDNISolone sodium succinate (solu-MEDROL) injection 125 mg (125 mg Intravenous $Given 11/26/23 1736)       NEW PRESCRIPTIONS STARTED AT TODAY'S ER VISIT  Discharge Medication List as of 11/26/2023  8:17 PM        START taking these medications    Details   albuterol  "(PROAIR HFA/PROVENTIL HFA/VENTOLIN HFA) 108 (90 Base) MCG/ACT inhaler Inhale 2 puffs into the lungs every 6 hours as needed for shortness of breath, wheezing or cough, Disp-18 g, R-0, E-PrescribePharmacy may dispense brand covered by insurance (Proair, or proventil or ventolin or generic albuterol inhaler)      doxycycline hyclate (VIBRAMYCIN) 100 MG capsule Take 1 capsule (100 mg) by mouth 2 times daily for 7 days, Disp-14 capsule, R-0, E-Prescribe      predniSONE (DELTASONE) 20 MG tablet Take two tablets (= 40mg) each day for 5 (five) days, Disp-10 tablet, R-0, E-Prescribe                =================================================================    HPI    Patient information was obtained from: patient     Use of : N/A       Elvin Eden is a 58 year old male with a pertinent history of DM2 who presents to this ED via walk-in for evaluation of shortness of breath.     The patient presents with 1 month of a \"deep, productive\" cough and associated headache. His whole family has had the same cough and they think it is bronchitis. The patient decided to come in today because he has had 2 days of shortness of breath with exertion and has a \"weight\" on his chest. This makes it slightly more difficult to breathe. He takes metformin and was recently started on Atorvastatin. He does not smoke and is a social drinker.     He has no history of DVT, PE or blood thinners. He denies nausea, vomiting, diarrhea, constipation, or any other complaints at this time.     REVIEW OF SYSTEMS   Review of Systems   Respiratory:  Positive for cough, chest tightness and shortness of breath.    Gastrointestinal:  Negative for constipation, diarrhea, nausea and vomiting.   Neurological:  Positive for headaches.        PAST MEDICAL HISTORY:  No past medical history on file.    PAST SURGICAL HISTORY:  No past surgical history on file.        CURRENT MEDICATIONS:    albuterol (PROAIR HFA/PROVENTIL HFA/VENTOLIN HFA) 108 (90 " Base) MCG/ACT inhaler  doxycycline hyclate (VIBRAMYCIN) 100 MG capsule  predniSONE (DELTASONE) 20 MG tablet  ammonium lactate (LAC-HYDRIN) 12 % cream  atorvastatin (LIPITOR) 10 MG tablet  cetirizine (ZYRTEC) 10 MG tablet  clobetasol (TEMOVATE) 0.05 % external solution  desonide (DESOWEN) 0.05 % ointment  doxycycline monohydrate (MONODOX) 100 MG capsule  famotidine (PEPCID) 20 MG tablet  glimepiride (AMARYL) 2 MG tablet  hydrocortisone 2.5 % cream  hydrOXYzine (ATARAX) 25 MG tablet  ketoconazole (NIZORAL) 2 % shampoo  metFORMIN (GLUCOPHAGE) 1000 MG tablet  minocycline (MINOCIN,DYNACIN) 100 MG capsule  multivitamin w/minerals (MULTI-VITAMIN) tablet  triamcinolone (KENALOG) 0.1 % ointment        ALLERGIES:  Allergies   Allergen Reactions    Iodine Other (See Comments)     Worsening eczema    Liraglutide Hives    Liraglutide      Hives per pateint    Seasonal Allergies        FAMILY HISTORY:  Family History   Problem Relation Age of Onset    Cancer No family hx of         no skin cancer    Alcoholism Mother     Depression Mother     Diabetes Father        SOCIAL HISTORY:   Social History     Socioeconomic History    Marital status:    Tobacco Use    Smoking status: Never    Smokeless tobacco: Never   Substance and Sexual Activity    Alcohol use: No    Drug use: No       VITALS:  /87   Pulse 96   Temp 97.2  F (36.2  C) (Temporal)   Resp 18   Wt 105 kg (231 lb 7.7 oz)   SpO2 91%   BMI 31.39 kg/m      PHYSICAL EXAM    PHYSICAL EXAM    Constitutional: Well developed, Well nourished, NAD  HENT: Normocephalic, Atraumatic, Bilateral external ears normal, Oropharynx normal, mucous membranes moist, Nose normal. Neck-  Normal range of motion, No tenderness, Supple, No stridor.   Eyes: PERRL, EOMI, Conjunctiva normal, No discharge.   Respiratory: Nonproductive cough appreciated over the course the examination.  Posterior lung examination notable for wheezes primarily at the base of the lungs bilaterally.  No  respiratory distress.  Cardiovascular: Normal heart rate, Regular rhythm, No murmurs Chest wall nontender.    GI:  Soft, No tenderness, No masses, No flank tenderness. No rebound or guarding.  : No cva tenderness    Musculoskeletal: 2+ DP pulses. No edema. No cyanosis. Good range of motion in all major joints. No tenderness to palpation. No tenderness of the CTLS spine.   Integument: Warm, Dry, No erythema, No rash. No petechiae.   Neurologic: Alert & oriented x 3, Normal motor function, Normal sensory function, No focal deficits noted.   Psychiatric: Affect normal, Judgment normal, Mood normal. Cooperative.     LAB:  All pertinent labs reviewed and interpreted.  Results for orders placed or performed during the hospital encounter of 11/26/23   Chest XR,  PA & LAT    Impression    IMPRESSION: Heart is normal in size. Minimal atelectasis at the left lung base. Lungs are otherwise clear.   Symptomatic Influenza A/B, RSV, & SARS-CoV2 PCR (COVID-19) Nasopharyngeal    Specimen: Nasopharyngeal; Swab   Result Value Ref Range    Influenza A PCR Negative Negative    Influenza B PCR Negative Negative    RSV PCR Negative Negative    SARS CoV2 PCR Negative Negative   Result Value Ref Range    INR 0.87 0.85 - 1.15   Comprehensive metabolic panel   Result Value Ref Range    Sodium 139 135 - 145 mmol/L    Potassium 4.2 3.4 - 5.3 mmol/L    Carbon Dioxide (CO2) 27 22 - 29 mmol/L    Anion Gap 9 7 - 15 mmol/L    Urea Nitrogen 23.7 (H) 6.0 - 20.0 mg/dL    Creatinine 1.03 0.67 - 1.17 mg/dL    GFR Estimate 85 >60 mL/min/1.73m2    Calcium 10.2 (H) 8.6 - 10.0 mg/dL    Chloride 103 98 - 107 mmol/L    Glucose 191 (H) 70 - 99 mg/dL    Alkaline Phosphatase 138 40 - 150 U/L    AST 44 0 - 45 U/L    ALT 64 0 - 70 U/L    Protein Total 7.3 6.4 - 8.3 g/dL    Albumin 4.5 3.5 - 5.2 g/dL    Bilirubin Total 0.4 <=1.2 mg/dL   Result Value Ref Range    Troponin T, High Sensitivity 8 <=22 ng/L   D dimer quantitative   Result Value Ref Range    D-Dimer  Quantitative 0.33 0.00 - 0.50 ug/mL FEU   Nt probnp inpatient (BNP)   Result Value Ref Range    N terminal Pro BNP Inpatient <36 0 - 900 pg/mL   CBC with platelets and differential   Result Value Ref Range    WBC Count 11.2 (H) 4.0 - 11.0 10e3/uL    RBC Count 5.12 4.40 - 5.90 10e6/uL    Hemoglobin 16.8 13.3 - 17.7 g/dL    Hematocrit 48.5 40.0 - 53.0 %    MCV 95 78 - 100 fL    MCH 32.8 26.5 - 33.0 pg    MCHC 34.6 31.5 - 36.5 g/dL    RDW 11.9 10.0 - 15.0 %    Platelet Count 317 150 - 450 10e3/uL    % Neutrophils 68 %    % Lymphocytes 20 %    % Monocytes 9 %    % Eosinophils 2 %    % Basophils 1 %    % Immature Granulocytes 0 %    NRBCs per 100 WBC 0 <1 /100    Absolute Neutrophils 7.7 1.6 - 8.3 10e3/uL    Absolute Lymphocytes 2.2 0.8 - 5.3 10e3/uL    Absolute Monocytes 1.0 0.0 - 1.3 10e3/uL    Absolute Eosinophils 0.2 0.0 - 0.7 10e3/uL    Absolute Basophils 0.1 0.0 - 0.2 10e3/uL    Absolute Immature Granulocytes 0.0 <=0.4 10e3/uL    Absolute NRBCs 0.0 10e3/uL   Result Value Ref Range    Troponin T, High Sensitivity 6 <=22 ng/L   ECG 12-LEAD WITH MUSE (LHE)   Result Value Ref Range    Systolic Blood Pressure 144 mmHg    Diastolic Blood Pressure 85 mmHg    Ventricular Rate 99 BPM    Atrial Rate 99 BPM    RI Interval 166 ms    QRS Duration 92 ms     ms    QTc 428 ms    P Axis 44 degrees    R AXIS 50 degrees    T Axis 37 degrees    Interpretation ECG       Sinus rhythm  Normal ECG  When compared with ECG of 12-APR-2019 20:33,  T wave inversion no longer evident in Inferior leads  Confirmed by SEE ED PROVIDER NOTE FOR, ECG INTERPRETATION ([id number]),  Barrett Herrera ([id number]) on 11/26/2023 8:34:11 PM         RADIOLOGY:  Reviewed all pertinent imaging. Please see official radiology report.  Chest XR,  PA & LAT   Final Result   IMPRESSION: Heart is normal in size. Minimal atelectasis at the left lung base. Lungs are otherwise clear.          EKG:    Performed at: November 26, 2023, 4:50 PM, [facility]  [facility] EMERGENCY ROOM    Impression: Sinus rhythm. Normal ECG. When compared with ECG of 12-APR-2019 20:33, T wave inversion no longer evident in Inferior leads.    Rate: 99 BPM  Rhythm: Sinus rhythm  Axis: 44 50 37  OH Interval: 166 ms  QRS Interval: 92 ms  QTc Interval: 334/428 ms  ST Changes: Nonischemic appearing ECG.  Comparison: When compared with ECG of 12-APR-2019 20:33, T wave inversion no longer evident in Inferior leads.    I have independently reviewed and interpreted the EKG(s) documented above.    PROCEDURES:   None      I, Tab Govea, am serving as a scribe to document services personally performed by Zi Bull M.D. based on my observation and the provider's statements to me. I, Zi Bull M.D., attest that Tab Govea is acting in a scribe capacity, has observed my performance of the services and has documented them in accordance with my direction.    Zi Bull M.D.  [facility] EMERGENCY ROOM  [address] [facility] [id number] [phone]       Zi Bull MD  11/26/23 [id number]

## 2023-11-27 NOTE — ED NOTES
AVS discussed with pt. Education provided on worsening symptoms to watch out for, new medications, and close follow-up with PCP. All questions were answered. Vitally stable upon discharge.    Tyrese Degroot RN on 11/26/2023 at 8:33 PM

## 2023-11-27 NOTE — DISCHARGE INSTRUCTIONS
Heart workup was negative.  Your blood sugar was mildly elevated.  This will need to be rechecked with your primary care doctor.  Overall your clinical history examination workup is consistent with bronchitis with a component of wheezing similar to an asthmatic.  Your oxygen is a little borderline.  Please  a pulse oximeter from the pharmacy.  These are purchased over-the-counter.  Monitor your oxygenation at home.  If your oxygen is dipping down into the 80s recommend repeat emergency department evaluation.  In addition, if you find that your symptoms are escalating instead of improving over the next 24 to 48 hours recommend repeat emergency department assessment.  Please follow-up with your primary care doctor this week for recheck.  Take antibiotics steroids and inhaler as prescribed.

## 2023-12-19 ENCOUNTER — LAB REQUISITION (OUTPATIENT)
Dept: LAB | Facility: CLINIC | Age: 58
End: 2023-12-19
Payer: COMMERCIAL

## 2023-12-19 DIAGNOSIS — E11.41 TYPE 2 DIABETES MELLITUS WITH DIABETIC MONONEUROPATHY (H): ICD-10-CM

## 2023-12-19 DIAGNOSIS — Z12.5 ENCOUNTER FOR SCREENING FOR MALIGNANT NEOPLASM OF PROSTATE: ICD-10-CM

## 2023-12-19 PROCEDURE — G0103 PSA SCREENING: HCPCS | Mod: ORL | Performed by: FAMILY MEDICINE

## 2023-12-19 PROCEDURE — 80053 COMPREHEN METABOLIC PANEL: CPT | Mod: ORL | Performed by: FAMILY MEDICINE

## 2023-12-20 LAB
ALBUMIN SERPL BCG-MCNC: 4.1 G/DL (ref 3.5–5.2)
ALP SERPL-CCNC: 141 U/L (ref 40–150)
ALT SERPL W P-5'-P-CCNC: 40 U/L (ref 0–70)
ANION GAP SERPL CALCULATED.3IONS-SCNC: 13 MMOL/L (ref 7–15)
AST SERPL W P-5'-P-CCNC: 19 U/L (ref 0–45)
BILIRUB SERPL-MCNC: 0.4 MG/DL
BUN SERPL-MCNC: 11 MG/DL (ref 6–20)
CALCIUM SERPL-MCNC: 9.8 MG/DL (ref 8.6–10)
CHLORIDE SERPL-SCNC: 102 MMOL/L (ref 98–107)
CREAT SERPL-MCNC: 0.94 MG/DL (ref 0.67–1.17)
DEPRECATED HCO3 PLAS-SCNC: 26 MMOL/L (ref 22–29)
EGFRCR SERPLBLD CKD-EPI 2021: >90 ML/MIN/1.73M2
GLUCOSE SERPL-MCNC: 268 MG/DL (ref 70–99)
POTASSIUM SERPL-SCNC: 4.5 MMOL/L (ref 3.4–5.3)
PROT SERPL-MCNC: 7 G/DL (ref 6.4–8.3)
PSA SERPL DL<=0.01 NG/ML-MCNC: 1.1 NG/ML (ref 0–3.5)
SODIUM SERPL-SCNC: 141 MMOL/L (ref 135–145)

## 2024-06-19 ENCOUNTER — LAB REQUISITION (OUTPATIENT)
Dept: LAB | Facility: CLINIC | Age: 59
End: 2024-06-19
Payer: COMMERCIAL

## 2024-06-19 DIAGNOSIS — E78.00 PURE HYPERCHOLESTEROLEMIA, UNSPECIFIED: ICD-10-CM

## 2024-06-19 PROCEDURE — 80061 LIPID PANEL: CPT | Mod: ORL | Performed by: FAMILY MEDICINE

## 2024-06-19 PROCEDURE — 80053 COMPREHEN METABOLIC PANEL: CPT | Mod: ORL | Performed by: FAMILY MEDICINE

## 2024-06-20 LAB
ALBUMIN SERPL BCG-MCNC: 4.5 G/DL (ref 3.5–5.2)
ALP SERPL-CCNC: 126 U/L (ref 40–150)
ALT SERPL W P-5'-P-CCNC: 57 U/L (ref 0–70)
ANION GAP SERPL CALCULATED.3IONS-SCNC: 14 MMOL/L (ref 7–15)
AST SERPL W P-5'-P-CCNC: 27 U/L (ref 0–45)
BILIRUB SERPL-MCNC: 0.4 MG/DL
BUN SERPL-MCNC: 17.5 MG/DL (ref 6–20)
CALCIUM SERPL-MCNC: 9.8 MG/DL (ref 8.6–10)
CHLORIDE SERPL-SCNC: 102 MMOL/L (ref 98–107)
CHOLEST SERPL-MCNC: 181 MG/DL
CREAT SERPL-MCNC: 1 MG/DL (ref 0.67–1.17)
DEPRECATED HCO3 PLAS-SCNC: 23 MMOL/L (ref 22–29)
EGFRCR SERPLBLD CKD-EPI 2021: 88 ML/MIN/1.73M2
FASTING STATUS PATIENT QL REPORTED: ABNORMAL
FASTING STATUS PATIENT QL REPORTED: ABNORMAL
GLUCOSE SERPL-MCNC: 136 MG/DL (ref 70–99)
HDLC SERPL-MCNC: 28 MG/DL
LDLC SERPL CALC-MCNC: 111 MG/DL
NONHDLC SERPL-MCNC: 153 MG/DL
POTASSIUM SERPL-SCNC: 4.5 MMOL/L (ref 3.4–5.3)
PROT SERPL-MCNC: 7.3 G/DL (ref 6.4–8.3)
SODIUM SERPL-SCNC: 139 MMOL/L (ref 135–145)
TRIGL SERPL-MCNC: 208 MG/DL

## 2024-12-17 ENCOUNTER — LAB REQUISITION (OUTPATIENT)
Dept: LAB | Facility: CLINIC | Age: 59
End: 2024-12-17
Payer: COMMERCIAL

## 2024-12-17 DIAGNOSIS — E78.00 PURE HYPERCHOLESTEROLEMIA, UNSPECIFIED: ICD-10-CM

## 2024-12-17 DIAGNOSIS — E11.41 TYPE 2 DIABETES MELLITUS WITH DIABETIC MONONEUROPATHY (H): ICD-10-CM

## 2024-12-18 LAB
ALBUMIN SERPL BCG-MCNC: 4.2 G/DL (ref 3.5–5.2)
ALP SERPL-CCNC: 120 U/L (ref 40–150)
ALT SERPL W P-5'-P-CCNC: 76 U/L (ref 0–70)
ANION GAP SERPL CALCULATED.3IONS-SCNC: 12 MMOL/L (ref 7–15)
AST SERPL W P-5'-P-CCNC: 30 U/L (ref 0–45)
BILIRUB SERPL-MCNC: 0.4 MG/DL
BUN SERPL-MCNC: 20.2 MG/DL (ref 6–20)
CALCIUM SERPL-MCNC: 9.3 MG/DL (ref 8.8–10.4)
CHLORIDE SERPL-SCNC: 104 MMOL/L (ref 98–107)
CHOLEST SERPL-MCNC: 188 MG/DL
CREAT SERPL-MCNC: 0.89 MG/DL (ref 0.67–1.17)
CREAT UR-MCNC: 101 MG/DL
EGFRCR SERPLBLD CKD-EPI 2021: >90 ML/MIN/1.73M2
FASTING STATUS PATIENT QL REPORTED: ABNORMAL
FASTING STATUS PATIENT QL REPORTED: ABNORMAL
GLUCOSE SERPL-MCNC: 140 MG/DL (ref 70–99)
HCO3 SERPL-SCNC: 24 MMOL/L (ref 22–29)
HDLC SERPL-MCNC: 29 MG/DL
LDLC SERPL CALC-MCNC: 120 MG/DL
MICROALBUMIN UR-MCNC: <12 MG/L
MICROALBUMIN/CREAT UR: NORMAL MG/G{CREAT}
NONHDLC SERPL-MCNC: 159 MG/DL
POTASSIUM SERPL-SCNC: 4.2 MMOL/L (ref 3.4–5.3)
PROT SERPL-MCNC: 7 G/DL (ref 6.4–8.3)
SODIUM SERPL-SCNC: 140 MMOL/L (ref 135–145)
TRIGL SERPL-MCNC: 197 MG/DL